# Patient Record
Sex: FEMALE | Race: OTHER | HISPANIC OR LATINO | Employment: FULL TIME | URBAN - METROPOLITAN AREA
[De-identification: names, ages, dates, MRNs, and addresses within clinical notes are randomized per-mention and may not be internally consistent; named-entity substitution may affect disease eponyms.]

---

## 2016-07-01 LAB
CFTR MUT ANL BLD/T: NORMAL
EXTERNAL ABO GROUPING: NORMAL
EXTERNAL ANTIBODY SCREEN: NORMAL
EXTERNAL CHLAMYDIA SCREEN: NORMAL
EXTERNAL GONORRHEA SCREEN: NORMAL
EXTERNAL HEMATOCRIT: 35 %
EXTERNAL HEMOGLOBIN: 11.3 G/DL
EXTERNAL HEPATITIS B SURFACE ANTIGEN: NORMAL
EXTERNAL HIV-1 ANTIBODY: NORMAL
EXTERNAL PLATELET COUNT: 173 K/ΜL
EXTERNAL RH FACTOR: POSITIVE
EXTERNAL RUBELLA IGG QUANTITATION: NORMAL
EXTERNAL SYPHILIS RPR SCREEN: NORMAL

## 2016-08-13 LAB — EXTERNAL AFP: NORMAL

## 2016-10-07 LAB — EXTERNAL SYPHILIS RPR SCREEN: NORMAL

## 2016-10-17 LAB — GLUCOSE 1H P 50 G GLC PO SERPL-MCNC: 119 MG/DL (ref 70–183)

## 2017-01-01 ENCOUNTER — HOSPITAL ENCOUNTER (INPATIENT)
Facility: HOSPITAL | Age: 32
LOS: 3 days | Discharge: HOME | End: 2017-01-04
Attending: OBSTETRICS & GYNECOLOGY | Admitting: OBSTETRICS & GYNECOLOGY
Payer: COMMERCIAL

## 2017-01-01 LAB
ABO GROUP BLD: NORMAL
BLD GP AB SCN SERPL QL: NEGATIVE
ERYTHROCYTE [DISTWIDTH] IN BLOOD BY AUTOMATED COUNT: 13.2 % (ref 11.6–15.1)
HCT VFR BLD AUTO: 36.1 % (ref 34.8–46.1)
HGB BLD-MCNC: 12.2 G/DL (ref 11.5–15.4)
MCH RBC QN AUTO: 30.4 PG (ref 26.8–34.3)
MCHC RBC AUTO-ENTMCNC: 33.8 G/DL (ref 31.4–37.4)
MCV RBC AUTO: 90 FL (ref 82–98)
PLATELET # BLD AUTO: 130 THOUSANDS/UL (ref 149–390)
PMV BLD AUTO: 11.9 FL (ref 8.9–12.7)
RBC # BLD AUTO: 4.01 MILLION/UL (ref 3.81–5.12)
RH BLD: POSITIVE
WBC # BLD AUTO: 11.9 THOUSAND/UL (ref 4.31–10.16)

## 2017-01-01 PROCEDURE — 86592 SYPHILIS TEST NON-TREP QUAL: CPT | Performed by: OBSTETRICS & GYNECOLOGY

## 2017-01-01 PROCEDURE — 4A1HXCZ MONITORING OF PRODUCTS OF CONCEPTION, CARDIAC RATE, EXTERNAL APPROACH: ICD-10-PCS | Performed by: OBSTETRICS & GYNECOLOGY

## 2017-01-01 PROCEDURE — 99202 OFFICE O/P NEW SF 15 MIN: CPT

## 2017-01-01 PROCEDURE — 85027 COMPLETE CBC AUTOMATED: CPT | Performed by: OBSTETRICS & GYNECOLOGY

## 2017-01-01 PROCEDURE — 86850 RBC ANTIBODY SCREEN: CPT | Performed by: OBSTETRICS & GYNECOLOGY

## 2017-01-01 PROCEDURE — 86900 BLOOD TYPING SEROLOGIC ABO: CPT | Performed by: OBSTETRICS & GYNECOLOGY

## 2017-01-01 PROCEDURE — 3E033VJ INTRODUCTION OF OTHER HORMONE INTO PERIPHERAL VEIN, PERCUTANEOUS APPROACH: ICD-10-PCS | Performed by: OBSTETRICS & GYNECOLOGY

## 2017-01-01 PROCEDURE — 86901 BLOOD TYPING SEROLOGIC RH(D): CPT | Performed by: OBSTETRICS & GYNECOLOGY

## 2017-01-01 RX ORDER — ONDANSETRON 2 MG/ML
4 INJECTION INTRAMUSCULAR; INTRAVENOUS EVERY 6 HOURS PRN
Status: DISCONTINUED | OUTPATIENT
Start: 2017-01-01 | End: 2017-01-02

## 2017-01-01 RX ORDER — LEVOTHYROXINE SODIUM 0.05 MG/1
50 TABLET ORAL DAILY
COMMUNITY

## 2017-01-01 RX ORDER — FERROUS SULFATE 325(65) MG
325 TABLET ORAL
COMMUNITY
End: 2018-05-06

## 2017-01-01 RX ORDER — BUTORPHANOL TARTRATE 1 MG/ML
1 INJECTION, SOLUTION INTRAMUSCULAR; INTRAVENOUS
Status: DISCONTINUED | OUTPATIENT
Start: 2017-01-01 | End: 2017-01-02

## 2017-01-01 RX ADMIN — SODIUM CHLORIDE 5 MILLION UNITS: 0.9 INJECTION, SOLUTION INTRAVENOUS at 22:37

## 2017-01-02 ENCOUNTER — ANESTHESIA (INPATIENT)
Dept: LABOR AND DELIVERY | Facility: HOSPITAL | Age: 32
End: 2017-01-02
Payer: COMMERCIAL

## 2017-01-02 ENCOUNTER — ANESTHESIA EVENT (INPATIENT)
Dept: LABOR AND DELIVERY | Facility: HOSPITAL | Age: 32
End: 2017-01-02
Payer: COMMERCIAL

## 2017-01-02 PROBLEM — Z3A.40 40 WEEKS GESTATION OF PREGNANCY: Status: ACTIVE | Noted: 2017-01-02

## 2017-01-02 LAB
BASE EXCESS BLDCOA CALC-SCNC: -6.8 MMOL/L (ref 3–11)
BASE EXCESS BLDCOV CALC-SCNC: -6.5 MMOL/L (ref 1–9)
HCO3 BLDCOA-SCNC: 19.8 MMOL/L (ref 17.3–27.3)
HCO3 BLDCOV-SCNC: 18 MMOL/L (ref 12.2–28.6)
O2 CT VFR BLDCOA CALC: 8.8 ML/DL
OXYHGB MFR BLDCOA: 38.7 %
OXYHGB MFR BLDCOV: 76.6 %
PCO2 BLDCOA: 43.5 MM[HG] (ref 30–60)
PCO2 BLDCOV: 33.7 MM HG (ref 27–43)
PH BLDCOA: 7.28 [PH] (ref 7.23–7.43)
PH BLDCOV: 7.35 [PH] (ref 7.19–7.49)
PO2 BLDCOA: 19.5 MM HG (ref 5–25)
PO2 BLDCOV: 33.5 MM HG (ref 15–45)
SAO2 % BLDCOV: 17.9 ML/DL

## 2017-01-02 PROCEDURE — 0UQMXZZ REPAIR VULVA, EXTERNAL APPROACH: ICD-10-PCS | Performed by: OBSTETRICS & GYNECOLOGY

## 2017-01-02 PROCEDURE — 4A1H7CZ MONITORING OF PRODUCTS OF CONCEPTION, CARDIAC RATE, VIA NATURAL OR ARTIFICIAL OPENING: ICD-10-PCS | Performed by: OBSTETRICS & GYNECOLOGY

## 2017-01-02 PROCEDURE — 0KQM0ZZ REPAIR PERINEUM MUSCLE, OPEN APPROACH: ICD-10-PCS | Performed by: OBSTETRICS & GYNECOLOGY

## 2017-01-02 PROCEDURE — 10H073Z INSERTION OF MONITORING ELECTRODE INTO PRODUCTS OF CONCEPTION, VIA NATURAL OR ARTIFICIAL OPENING: ICD-10-PCS | Performed by: OBSTETRICS & GYNECOLOGY

## 2017-01-02 PROCEDURE — 82805 BLOOD GASES W/O2 SATURATION: CPT | Performed by: OBSTETRICS & GYNECOLOGY

## 2017-01-02 RX ORDER — OXYCODONE HYDROCHLORIDE AND ACETAMINOPHEN 5; 325 MG/1; MG/1
1 TABLET ORAL EVERY 4 HOURS PRN
Status: DISCONTINUED | OUTPATIENT
Start: 2017-01-02 | End: 2017-01-04 | Stop reason: HOSPADM

## 2017-01-02 RX ORDER — BUPIVACAINE HYDROCHLORIDE 2.5 MG/ML
10 INJECTION, SOLUTION EPIDURAL; INFILTRATION; INTRACAUDAL ONCE
Status: COMPLETED | OUTPATIENT
Start: 2017-01-02 | End: 2017-01-02

## 2017-01-02 RX ORDER — LIDOCAINE HYDROCHLORIDE 10 MG/ML
10 INJECTION, SOLUTION EPIDURAL; INFILTRATION; INTRACAUDAL; PERINEURAL ONCE
Status: DISCONTINUED | OUTPATIENT
Start: 2017-01-02 | End: 2017-01-02

## 2017-01-02 RX ORDER — OXYTOCIN/RINGER'S LACTATE 30/500 ML
250 PLASTIC BAG, INJECTION (ML) INTRAVENOUS CONTINUOUS
Status: DISCONTINUED | OUTPATIENT
Start: 2017-01-02 | End: 2017-01-04 | Stop reason: HOSPADM

## 2017-01-02 RX ORDER — IBUPROFEN 600 MG/1
600 TABLET ORAL EVERY 6 HOURS PRN
Status: DISCONTINUED | OUTPATIENT
Start: 2017-01-02 | End: 2017-01-04 | Stop reason: HOSPADM

## 2017-01-02 RX ORDER — OXYCODONE HYDROCHLORIDE AND ACETAMINOPHEN 5; 325 MG/1; MG/1
2 TABLET ORAL EVERY 4 HOURS PRN
Status: DISCONTINUED | OUTPATIENT
Start: 2017-01-02 | End: 2017-01-04 | Stop reason: HOSPADM

## 2017-01-02 RX ORDER — OXYTOCIN/RINGER'S LACTATE 30/500 ML
2 PLASTIC BAG, INJECTION (ML) INTRAVENOUS
Status: DISCONTINUED | OUTPATIENT
Start: 2017-01-02 | End: 2017-01-02

## 2017-01-02 RX ORDER — LEVOTHYROXINE SODIUM 0.05 MG/1
50 TABLET ORAL
Status: DISCONTINUED | OUTPATIENT
Start: 2017-01-02 | End: 2017-01-04 | Stop reason: HOSPADM

## 2017-01-02 RX ORDER — SODIUM CHLORIDE, SODIUM LACTATE, POTASSIUM CHLORIDE, CALCIUM CHLORIDE 600; 310; 30; 20 MG/100ML; MG/100ML; MG/100ML; MG/100ML
125 INJECTION, SOLUTION INTRAVENOUS CONTINUOUS
Status: DISCONTINUED | OUTPATIENT
Start: 2017-01-02 | End: 2017-01-02

## 2017-01-02 RX ORDER — ACETAMINOPHEN 325 MG/1
650 TABLET ORAL EVERY 6 HOURS PRN
Status: DISCONTINUED | OUTPATIENT
Start: 2017-01-02 | End: 2017-01-04 | Stop reason: HOSPADM

## 2017-01-02 RX ORDER — ROPIVACAINE HYDROCHLORIDE 2 MG/ML
INJECTION, SOLUTION EPIDURAL; INFILTRATION; PERINEURAL AS NEEDED
Status: DISCONTINUED | OUTPATIENT
Start: 2017-01-02 | End: 2017-01-02 | Stop reason: SURG

## 2017-01-02 RX ORDER — DOCUSATE SODIUM 100 MG/1
100 CAPSULE, LIQUID FILLED ORAL 2 TIMES DAILY
Status: DISCONTINUED | OUTPATIENT
Start: 2017-01-02 | End: 2017-01-04 | Stop reason: HOSPADM

## 2017-01-02 RX ORDER — BUPIVACAINE HYDROCHLORIDE 2.5 MG/ML
INJECTION, SOLUTION EPIDURAL; INFILTRATION; INTRACAUDAL
Status: COMPLETED
Start: 2017-01-02 | End: 2017-01-02

## 2017-01-02 RX ORDER — DIAPER,BRIEF,INFANT-TODD,DISP
1 EACH MISCELLANEOUS AS NEEDED
Status: DISCONTINUED | OUTPATIENT
Start: 2017-01-02 | End: 2017-01-04 | Stop reason: HOSPADM

## 2017-01-02 RX ORDER — OXYTOCIN/RINGER'S LACTATE 30/500 ML
PLASTIC BAG, INJECTION (ML) INTRAVENOUS
Status: COMPLETED
Start: 2017-01-02 | End: 2017-01-02

## 2017-01-02 RX ORDER — SIMETHICONE 80 MG
80 TABLET,CHEWABLE ORAL 4 TIMES DAILY PRN
Status: DISCONTINUED | OUTPATIENT
Start: 2017-01-02 | End: 2017-01-04 | Stop reason: HOSPADM

## 2017-01-02 RX ADMIN — SODIUM CHLORIDE 2.5 MILLION UNITS: 9 INJECTION, SOLUTION INTRAVENOUS at 11:00

## 2017-01-02 RX ADMIN — ROPIVACAINE HYDROCHLORIDE 4 ML: 2 INJECTION, SOLUTION EPIDURAL; INFILTRATION at 10:17

## 2017-01-02 RX ADMIN — Medication 30 UNITS: at 00:40

## 2017-01-02 RX ADMIN — Medication 5 ML: at 04:59

## 2017-01-02 RX ADMIN — Medication: at 05:04

## 2017-01-02 RX ADMIN — DOCUSATE SODIUM 100 MG: 100 CAPSULE, LIQUID FILLED ORAL at 13:11

## 2017-01-02 RX ADMIN — ROPIVACAINE HYDROCHLORIDE 4 ML: 2 INJECTION, SOLUTION EPIDURAL; INFILTRATION at 08:07

## 2017-01-02 RX ADMIN — SODIUM CHLORIDE, SODIUM LACTATE, POTASSIUM CHLORIDE, AND CALCIUM CHLORIDE 125 ML/HR: .6; .31; .03; .02 INJECTION, SOLUTION INTRAVENOUS at 00:41

## 2017-01-02 RX ADMIN — ROPIVACAINE HYDROCHLORIDE 4 ML: 2 INJECTION, SOLUTION EPIDURAL; INFILTRATION at 08:10

## 2017-01-02 RX ADMIN — BUPIVACAINE HYDROCHLORIDE 10 ML: 2.5 INJECTION, SOLUTION EPIDURAL; INFILTRATION; INTRACAUDAL at 12:00

## 2017-01-02 RX ADMIN — Medication 5 ML: at 04:54

## 2017-01-02 RX ADMIN — LEVOTHYROXINE SODIUM 50 MCG: 50 TABLET ORAL at 13:11

## 2017-01-02 RX ADMIN — ROPIVACAINE HYDROCHLORIDE 4 ML: 2 INJECTION, SOLUTION EPIDURAL; INFILTRATION at 10:19

## 2017-01-02 RX ADMIN — Medication 5 ML: at 04:53

## 2017-01-02 RX ADMIN — SODIUM CHLORIDE 2.5 MILLION UNITS: 9 INJECTION, SOLUTION INTRAVENOUS at 06:02

## 2017-01-02 RX ADMIN — SODIUM CHLORIDE 2.5 MILLION UNITS: 9 INJECTION, SOLUTION INTRAVENOUS at 02:53

## 2017-01-03 LAB — RPR SER QL: NORMAL

## 2017-01-03 RX ADMIN — DOCUSATE SODIUM 100 MG: 100 CAPSULE, LIQUID FILLED ORAL at 22:40

## 2017-01-03 RX ADMIN — LEVOTHYROXINE SODIUM 50 MCG: 50 TABLET ORAL at 06:00

## 2017-01-04 VITALS
DIASTOLIC BLOOD PRESSURE: 88 MMHG | HEART RATE: 71 BPM | TEMPERATURE: 97.6 F | RESPIRATION RATE: 18 BRPM | SYSTOLIC BLOOD PRESSURE: 135 MMHG | WEIGHT: 260 LBS | HEIGHT: 63 IN | BODY MASS INDEX: 46.07 KG/M2

## 2017-01-04 RX ORDER — DIAPER,BRIEF,INFANT-TODD,DISP
1 EACH MISCELLANEOUS AS NEEDED
Qty: 30 G | Refills: 0 | Status: SHIPPED | OUTPATIENT
Start: 2017-01-04 | End: 2018-05-06

## 2017-01-04 RX ADMIN — LEVOTHYROXINE SODIUM 50 MCG: 50 TABLET ORAL at 06:12

## 2017-01-09 ENCOUNTER — TELEPHONE (OUTPATIENT)
Dept: LABOR AND DELIVERY | Facility: HOSPITAL | Age: 32
End: 2017-01-09

## 2017-01-11 LAB — PLACENTA IN STORAGE: NORMAL

## 2018-05-06 ENCOUNTER — HOSPITAL ENCOUNTER (EMERGENCY)
Facility: HOSPITAL | Age: 33
Discharge: HOME | End: 2018-05-06
Attending: EMERGENCY MEDICINE | Admitting: EMERGENCY MEDICINE
Payer: COMMERCIAL

## 2018-05-06 VITALS
WEIGHT: 260 LBS | RESPIRATION RATE: 18 BRPM | BODY MASS INDEX: 46.8 KG/M2 | SYSTOLIC BLOOD PRESSURE: 123 MMHG | DIASTOLIC BLOOD PRESSURE: 83 MMHG | TEMPERATURE: 97.6 F | OXYGEN SATURATION: 97 % | HEART RATE: 86 BPM

## 2018-05-06 DIAGNOSIS — L50.9 URTICARIA: Primary | ICD-10-CM

## 2018-05-06 PROCEDURE — 99283 EMERGENCY DEPT VISIT LOW MDM: CPT

## 2018-05-06 RX ORDER — PREDNISONE 20 MG/1
60 TABLET ORAL ONCE
Status: COMPLETED | OUTPATIENT
Start: 2018-05-06 | End: 2018-05-06

## 2018-05-06 RX ORDER — PREDNISONE 10 MG/1
TABLET ORAL
Qty: 30 TABLET | Refills: 0 | Status: SHIPPED | OUTPATIENT
Start: 2018-05-06 | End: 2019-08-02

## 2018-05-06 RX ADMIN — PREDNISONE 60 MG: 20 TABLET ORAL at 09:42

## 2018-05-06 NOTE — ED PROVIDER NOTES
History  Chief Complaint   Patient presents with    Rash     Noticed 2 weeks ago  On extremities and abd  Pt states this has happened to her in the past   Got better with prednisone at that time  Has tried Benadryl and Allegra with little relief  History provided by:  Patient   used: No    Rash   Location:  Torso and shoulder/arm  Shoulder/arm rash location:  L arm and R arm  Torso rash location:  Abd LUQ, abd LLQ, abd RLQ, abd RUQ and upper back  Quality: itchiness    Quality: not blistering, not bruising, not burning, not painful, not swelling and not weeping    Duration:  2 weeks  Progression:  Worsening  Chronicity:  Recurrent  Context: not medications and not sick contacts    Relieved by:  Nothing  Worsened by:  Nothing  Ineffective treatments:  Antihistamines  Associated symptoms: no abdominal pain, no diarrhea, no fever, no nausea, no shortness of breath, no sore throat, no throat swelling, no tongue swelling and not vomiting        Prior to Admission Medications   Prescriptions Last Dose Informant Patient Reported? Taking?   levothyroxine 50 mcg tablet   Yes No   Sig: Take 50 mcg by mouth daily      Facility-Administered Medications: None       Past Medical History:   Diagnosis Date    with pregnancy        Past Surgical History:   Procedure Laterality Date    POLYPECTOMY         History reviewed  No pertinent family history  I have reviewed and agree with the history as documented  Social History   Substance Use Topics    Smoking status: Never Smoker    Smokeless tobacco: Never Used    Alcohol use No        Review of Systems   Constitutional: Negative for chills and fever  HENT: Negative for congestion, ear discharge, ear pain, postnasal drip, rhinorrhea, sinus pressure, sneezing, sore throat and trouble swallowing  Eyes: Negative for discharge and redness  Respiratory: Negative for cough and shortness of breath  Cardiovascular: Negative for chest pain  Gastrointestinal: Negative for abdominal pain, diarrhea, nausea and vomiting  Skin: Positive for rash  All other systems reviewed and are negative  Physical Exam  ED Triage Vitals [05/06/18 0850]   Temperature Pulse Respirations Blood Pressure SpO2   97 6 °F (36 4 °C) 86 18 123/83 97 %      Temp src Heart Rate Source Patient Position - Orthostatic VS BP Location FiO2 (%)   -- -- -- -- --      Pain Score       No Pain           Orthostatic Vital Signs  Vitals:    05/06/18 0850   BP: 123/83   Pulse: 86       Physical Exam   Constitutional: She is oriented to person, place, and time  She appears well-developed and well-nourished  Non-toxic appearance  She does not have a sickly appearance  She does not appear ill  HENT:   Head: Normocephalic and atraumatic  Right Ear: Tympanic membrane normal  No drainage  Tympanic membrane is not injected, not erythematous and not bulging  No middle ear effusion  Left Ear: Tympanic membrane normal  No drainage  Tympanic membrane is not injected, not erythematous and not bulging  No middle ear effusion  Nose: Nose normal    Mouth/Throat: Oropharynx is clear and moist  No oropharyngeal exudate  Eyes: Conjunctivae are normal  Right eye exhibits no discharge  Left eye exhibits no discharge  Right conjunctiva is not injected  Left conjunctiva is not injected  Neck: Normal range of motion  Neck supple  No neck rigidity  Cardiovascular: Normal rate, regular rhythm, normal heart sounds and intact distal pulses  Exam reveals no gallop and no friction rub  No murmur heard  Pulmonary/Chest: Effort normal and breath sounds normal  No stridor  No respiratory distress  She has no wheezes  She has no rales  Abdominal: Soft  Bowel sounds are normal  She exhibits no distension  There is no tenderness  There is no rebound and no guarding  Lymphadenopathy:     She has no cervical adenopathy  Neurological: She is alert and oriented to person, place, and time     Skin: Skin is warm and dry  Rash noted  Rash is urticarial (To abdomen, back, arms)  No pallor  Nursing note and vitals reviewed  ED Medications  Medications   predniSONE tablet 60 mg (60 mg Oral Given 5/6/18 0942)       Diagnostic Studies  Results Reviewed     None                 No orders to display              Procedures  Procedures       Phone Contacts  ED Phone Contact    ED Course                               MDM  CritCare Time    Disposition  Final diagnoses:   Urticaria     Time reflects when diagnosis was documented in both MDM as applicable and the Disposition within this note     Time User Action Codes Description Comment    5/6/2018  9:34 AM Lotus Zambrano Add [L50 9] Urticaria       ED Disposition     ED Disposition Condition Comment    Discharge  Milena Colon discharge to home/self care  Condition at discharge: Good        Follow-up Information    None       Discharge Medication List as of 5/6/2018  9:38 AM      START taking these medications    Details   predniSONE 10 mg tablet Take 4 tabs x 3 days, 3 tabs x 3 days, 2 tabs x 3 days, 1 tab x 3 days, Print         CONTINUE these medications which have NOT CHANGED    Details   levothyroxine 50 mcg tablet Take 50 mcg by mouth daily, Until Discontinued, Historical Med           No discharge procedures on file      ED Provider  Electronically Signed by           Moisés Pizarro 24, DO  05/06/18 1010

## 2018-05-06 NOTE — ED NOTES
Patient has had few hives over the past 2 weeks  Treated with OTC Allegra  Woke today with more hives on her torso [place] around her waist and lower abdomen  Has not taken any Allegra today  No respiratory distress  c/o itching    Reports no change in hygiene products, soap, etc      Rolfe Hatchet, RN  05/06/18 0900

## 2018-05-06 NOTE — DISCHARGE INSTRUCTIONS
Urticaria   WHAT YOU NEED TO KNOW:   Urticaria is also called hives  Hives can change size and shape, and appear anywhere on your skin  They can be mild or severe and last from a few minutes to a few days  Hives may be a sign of a severe allergic reaction called anaphylaxis that needs immediate treatment  Urticaria that lasts longer than 6 weeks may be a chronic condition that needs long-term treatment  DISCHARGE INSTRUCTIONS:   Call 911 for signs or symptoms of anaphylaxis,  such as trouble breathing, swelling in your mouth or throat, or wheezing  You may also have itching, a rash, or feel like you are going to faint  Return to the emergency department if:   · Your heart is beating faster than it normally does  · You have cramping or severe pain in your abdomen  Contact your healthcare provider if:   · You have a fever  · Your skin still itches 24 hours after you take your medicine  · You still have hives after 7 days  · Your joints are painful and swollen  · You have questions or concerns about your condition or care  Medicines:   · Epinephrine  is used to treat severe allergic reactions such as anaphylaxis  · Antihistamines  decrease mild symptoms such as itching or a rash  · Steroids  decrease redness, pain, and swelling  · Take your medicine as directed  Contact your healthcare provider if you think your medicine is not helping or if you have side effects  Tell him of her if you are allergic to any medicine  Keep a list of the medicines, vitamins, and herbs you take  Include the amounts, and when and why you take them  Bring the list or the pill bottles to follow-up visits  Carry your medicine list with you in case of an emergency  Steps to take for signs or symptoms of anaphylaxis:   · Immediately  give 1 shot of epinephrine only into the outer thigh muscle  · Leave the shot in place  as directed   Your healthcare provider may recommend you leave it in place for up to 10 seconds before you remove it  This helps make sure all of the epinephrine is delivered  · Call 911 and go to the emergency department,  even if the shot improved symptoms  Do not drive yourself  Bring the used epinephrine shot with you  Safety precautions to take if you are at risk for anaphylaxis:   · Keep 2 shots of epinephrine with you at all times  You may need a second shot, because epinephrine only works for about 20 minutes and symptoms may return  Your healthcare provider can show you and family members how to give the shot  Check the expiration date every month and replace it before it expires  · Create an action plan  Your healthcare provider can help you create a written plan that explains the allergy and an emergency plan to treat a reaction  The plan explains when to give a second epinephrine shot if symptoms return or do not improve after the first  Give copies of the action plan and emergency instructions to family members, work and school staff, and  providers  Show them how to give a shot of epinephrine  · Be careful when you exercise  If you have had exercise-induced anaphylaxis, do not exercise right after you eat  Stop exercising right away if you start to develop any signs or symptoms of anaphylaxis  You may first feel tired, warm, or have itchy skin  Hives, swelling, and severe breathing problems may develop if you continue to exercise  · Carry medical alert identification  Wear medical alert jewelry or carry a card that explains the allergy  Ask your healthcare provider where to get these items  · Keep a record of triggers and symptoms  Record everything you eat, drink, or apply to your skin for 3 weeks  Include stressful events and what you were doing right before your hives started  Bring the record with you to follow-up visits with your healthcare provider  Manage urticaria:   · Cool your skin  This may help decrease itching  Apply a cool pack to your hives  Dip a hand towel in cool water, wring it out, and place it on your hives  You may also soak your skin in a cool oatmeal bath  · Do not rub your hives  This can irritate your skin and cause more hives  · Wear loose clothing  Tight clothes may irritate your skin and cause more hives  · Manage stress  Stress may trigger hives, or make them worse  Learn new ways to relax, such as deep breathing  Follow up with your healthcare provider as directed:  Write down your questions so you remember to ask them during your visits  © 2017 [address] Information is for End User's use only and may not be sold, redistributed or otherwise used for commercial purposes  All illustrations and images included in CareNotes® are the copyrighted property of [employer]  or Fredy Mcdonald  The above information is an  only  It is not intended as medical advice for individual conditions or treatments  Talk to your doctor, nurse or pharmacist before following any medical regimen to see if it is safe and effective for you

## 2018-08-06 ENCOUNTER — APPOINTMENT (EMERGENCY)
Dept: ULTRASOUND IMAGING | Facility: HOSPITAL | Age: 33
End: 2018-08-06
Payer: COMMERCIAL

## 2018-08-06 ENCOUNTER — HOSPITAL ENCOUNTER (EMERGENCY)
Facility: HOSPITAL | Age: 33
Discharge: HOME | End: 2018-08-07
Attending: EMERGENCY MEDICINE
Payer: COMMERCIAL

## 2018-08-06 VITALS
WEIGHT: 265 LBS | TEMPERATURE: 98.1 F | OXYGEN SATURATION: 98 % | RESPIRATION RATE: 16 BRPM | DIASTOLIC BLOOD PRESSURE: 81 MMHG | HEART RATE: 83 BPM | SYSTOLIC BLOOD PRESSURE: 118 MMHG | BODY MASS INDEX: 47.7 KG/M2

## 2018-08-06 DIAGNOSIS — N93.9 VAGINAL BLEEDING: ICD-10-CM

## 2018-08-06 DIAGNOSIS — Z34.90 PREGNANCY: ICD-10-CM

## 2018-08-06 DIAGNOSIS — O20.0 THREATENED ABORTION: Primary | ICD-10-CM

## 2018-08-06 DIAGNOSIS — N39.0 UTI (URINARY TRACT INFECTION): ICD-10-CM

## 2018-08-06 DIAGNOSIS — R10.9 ABDOMINAL CRAMPING: ICD-10-CM

## 2018-08-06 DIAGNOSIS — O41.8X10 SUBCHORIONIC HEMATOMA IN FIRST TRIMESTER: ICD-10-CM

## 2018-08-06 DIAGNOSIS — O46.8X1 SUBCHORIONIC HEMATOMA IN FIRST TRIMESTER: ICD-10-CM

## 2018-08-06 LAB
B-HCG SERPL-ACNC: ABNORMAL MIU/ML
BACTERIA UR QL AUTO: ABNORMAL /HPF
BASOPHILS # BLD AUTO: 0.02 THOUSANDS/ΜL (ref 0–0.1)
BASOPHILS NFR BLD AUTO: 0 % (ref 0–1)
BILIRUB UR QL STRIP: ABNORMAL
CLARITY UR: ABNORMAL
COLOR UR: ABNORMAL
EOSINOPHIL # BLD AUTO: 0.27 THOUSAND/ΜL (ref 0–0.61)
EOSINOPHIL NFR BLD AUTO: 3 % (ref 0–6)
ERYTHROCYTE [DISTWIDTH] IN BLOOD BY AUTOMATED COUNT: 14 % (ref 11.6–15.1)
EXT PREG TEST URINE: ABNORMAL
GLUCOSE UR STRIP-MCNC: NEGATIVE MG/DL
HCT VFR BLD AUTO: 33.5 % (ref 34.8–46.1)
HGB BLD-MCNC: 11.1 G/DL (ref 11.5–15.4)
HGB UR QL STRIP.AUTO: ABNORMAL
KETONES UR STRIP-MCNC: ABNORMAL MG/DL
LEUKOCYTE ESTERASE UR QL STRIP: ABNORMAL
LYMPHOCYTES # BLD AUTO: 2.78 THOUSANDS/ΜL (ref 0.6–4.47)
LYMPHOCYTES NFR BLD AUTO: 27 % (ref 14–44)
MCH RBC QN AUTO: 29.1 PG (ref 26.8–34.3)
MCHC RBC AUTO-ENTMCNC: 33.1 G/DL (ref 31.4–37.4)
MCV RBC AUTO: 88 FL (ref 82–98)
MONOCYTES # BLD AUTO: 0.66 THOUSAND/ΜL (ref 0.17–1.22)
MONOCYTES NFR BLD AUTO: 6 % (ref 4–12)
NEUTROPHILS # BLD AUTO: 6.56 THOUSANDS/ΜL (ref 1.85–7.62)
NEUTS SEG NFR BLD AUTO: 64 % (ref 43–75)
NITRITE UR QL STRIP: POSITIVE
NON-SQ EPI CELLS URNS QL MICRO: ABNORMAL /HPF
NRBC BLD AUTO-RTO: 0 /100 WBCS
PH UR STRIP.AUTO: 5 [PH] (ref 4.5–8)
PLATELET # BLD AUTO: 174 THOUSANDS/UL (ref 149–390)
PMV BLD AUTO: 10.4 FL (ref 8.9–12.7)
PROT UR STRIP-MCNC: >=300 MG/DL
RBC # BLD AUTO: 3.81 MILLION/UL (ref 3.81–5.12)
RBC #/AREA URNS AUTO: ABNORMAL /HPF
SP GR UR STRIP.AUTO: >=1.03 (ref 1–1.03)
UROBILINOGEN UR QL STRIP.AUTO: 2 E.U./DL
WBC # BLD AUTO: 10.29 THOUSAND/UL (ref 4.31–10.16)
WBC #/AREA URNS AUTO: ABNORMAL /HPF

## 2018-08-06 PROCEDURE — 86900 BLOOD TYPING SEROLOGIC ABO: CPT | Performed by: EMERGENCY MEDICINE

## 2018-08-06 PROCEDURE — 76801 OB US < 14 WKS SINGLE FETUS: CPT

## 2018-08-06 PROCEDURE — 86901 BLOOD TYPING SEROLOGIC RH(D): CPT | Performed by: EMERGENCY MEDICINE

## 2018-08-06 PROCEDURE — 81001 URINALYSIS AUTO W/SCOPE: CPT

## 2018-08-06 PROCEDURE — 81025 URINE PREGNANCY TEST: CPT | Performed by: EMERGENCY MEDICINE

## 2018-08-06 PROCEDURE — 36415 COLL VENOUS BLD VENIPUNCTURE: CPT | Performed by: EMERGENCY MEDICINE

## 2018-08-06 PROCEDURE — 86850 RBC ANTIBODY SCREEN: CPT | Performed by: EMERGENCY MEDICINE

## 2018-08-06 PROCEDURE — 87086 URINE CULTURE/COLONY COUNT: CPT

## 2018-08-06 PROCEDURE — 85025 COMPLETE CBC W/AUTO DIFF WBC: CPT | Performed by: EMERGENCY MEDICINE

## 2018-08-06 PROCEDURE — 84702 CHORIONIC GONADOTROPIN TEST: CPT | Performed by: EMERGENCY MEDICINE

## 2018-08-07 LAB
ABO GROUP BLD: NORMAL
BLD GP AB SCN SERPL QL: NEGATIVE
RH BLD: POSITIVE
SPECIMEN EXPIRATION DATE: NORMAL

## 2018-08-07 PROCEDURE — 99284 EMERGENCY DEPT VISIT MOD MDM: CPT

## 2018-08-07 RX ORDER — CEPHALEXIN 500 MG/1
500 CAPSULE ORAL 2 TIMES DAILY
Qty: 14 CAPSULE | Refills: 0 | Status: SHIPPED | OUTPATIENT
Start: 2018-08-07 | End: 2018-08-14

## 2018-08-07 NOTE — DISCHARGE INSTRUCTIONS
You have an intrauterine pregnancy that is measured at 11 weeks  You also have what is called a subchorionic hemorrhage that is causing the bleeding  You could still and up having a miscarriage however as of right now your pregnancy is living  Please return emergency department if you have any increase in bleeding greater than 1 pad an hour, any chest pain associated with this bleeding feelings of lightheadedness or wanting to pass out or any other concerns  Otherwise please follow-up with obstetrics  Their phone number has been provided  Abdominal Pain, Ambulatory Care   GENERAL INFORMATION:   Abdominal pain  can be dull, achy, or sharp  You may have pain in one area of your abdomen, or in your entire abdomen  Your pain may be caused by constipation, food sensitivity or poisoning, infection, or a blockage  Abdominal pain can also be caused by a hernia, appendicitis, or an ulcer  The cause of your abdominal pain may be unknown  Seek immediate care for the following symptoms:   · New chest pain or shortness of breath    · Pulsing pain in your upper abdomen or lower back that suddenly becomes constant    · Pain in the right lower abdominal area that worsens with movement    · Fever over 100 4°F (38°C) or shaking chills    · Vomiting and you cannot keep food or fluids down    · Pain does not improve or gets worse over the next 8 to 12 hours    · Blood in your vomit or bowel movements, or they look black and tarry    · Skin or the whites of your eyes turn yellow    · Large amount of vaginal bleeding that is not your monthly period  Treatment for abdominal pain  may include medicine to calm your stomach, prevent vomiting, or decrease pain  Follow up with your healthcare provider as directed:  Write down your questions so you remember to ask them during your visits  CARE AGREEMENT:   You have the right to help plan your care  Learn about your health condition and how it may be treated   Discuss treatment options with your caregivers to decide what care you want to receive  You always have the right to refuse treatment  The above information is an  only  It is not intended as medical advice for individual conditions or treatments  Talk to your doctor, nurse or pharmacist before following any medical regimen to see if it is safe and effective for you  2014 [address] is for End User's use only and may not be sold, redistributed or otherwise used for commercial purposes  All illustrations and images included in CareNotes® are the copyrighted property of A [employer] M [employer]  or Fredy Mcdonald  Urinary Tract Infection in Pregnancy   WHAT YOU NEED TO KNOW:   A urinary tract infection (UTI) is caused by bacteria that get inside your urinary tract  The urinary tract includes your kidneys and bladder  UTIs are common in women, especially during pregnancy  This is because of changes in your immune system, hormones, and uterus  As your uterus grows, your bladder may not completely empty  Bacteria can grow in the urine left in your bladder and cause a UTI  UTIs during pregnancy can increase your risk for a kidney infection and  labor  DISCHARGE INSTRUCTIONS:   Return to the emergency department if:   · You are urinating very little or not at all  · You have severe pain  · You have a fever and chills  Contact your healthcare provider if:   · You have pain in the sides of your back  · You do not feel better after 2 days of treatment  · You are vomiting  · You have questions or concerns about your condition or care  Medicines:   · Antibiotics  help fight a bacterial infection  · Medicines  may be given to decrease pain and burning when you urinate  They will also help decrease the feeling that you need to urinate often  These medicines will make your urine orange or red  · Take your medicine as directed    Contact your healthcare provider if you think your medicine is not helping or if you have side effects  Tell him or her if you are allergic to any medicine  Keep a list of the medicines, vitamins, and herbs you take  Include the amounts, and when and why you take them  Bring the list or the pill bottles to follow-up visits  Carry your medicine list with you in case of an emergency  Prevent another UTI:   · Urinate when you feel the urge  Do not hold your urine  Urinate as soon as needed  Always urinate after you have sex  This helps flush out bacteria passed during sex  · Drink liquids as directed  Ask how much liquid to drink each day and which liquids are best for you  You may need to drink more fluids than usual to help flush bacteria out of your urinary tract  Do not drink caffeine or carbonated liquids  These drinks can irritate your bladder  Your healthcare provider may recommend cranberry juice to help prevent a UTI  · Wipe from front to back after you urinate or have a bowel movement  This will help prevent germs from getting into your urinary tract through your urethra  · Do pelvic muscle exercises often  Pelvic muscle exercises may help you start and stop urinating  Strong pelvic muscles may help you empty your bladder easier  Squeeze these muscles tightly for 5 seconds like you are trying to hold back urine  Then relax for 5 seconds  Gradually work up to squeezing for 10 seconds  Do 3 sets of 15 repetitions a day, or as directed  Follow up with your healthcare provider as directed: You may need to return for more urine tests  Write down your questions so you remember to ask them during your visits  © 2017 [address] Information is for End User's use only and may not be sold, redistributed or otherwise used for commercial purposes  All illustrations and images included in CareNotes® are the copyrighted property of [employer]  or Fredy Mcdonald    The above information is an  only  It is not intended as medical advice for individual conditions or treatments  Talk to your doctor, nurse or pharmacist before following any medical regimen to see if it is safe and effective for you  Threatened Miscarriage   WHAT YOU NEED TO KNOW:   A threatened miscarriage occurs when you have vaginal bleeding within the first 20 weeks of pregnancy  It means that a miscarriage may happen  A threatened miscarriage may also be called a threatened   DISCHARGE INSTRUCTIONS:   Return to the emergency department if:   · You feel weak or faint  · Your pain or cramping in your abdomen or back gets worse  · You have vaginal bleeding that soaks 1 or more pads in an hour  · You pass material that looks like tissue or large clots  Contact your healthcare provider or obstetrician if:   · You have a fever  · You have trouble urinating, burning when you urinate, or feel a need to urinate often  · You have new or worsening vaginal bleeding  · You have vaginal pain or itching, or vaginal discharge that is yellow, green, or foul-smelling  · You have questions or concerns about your condition or care  Self-care: The following may help you manage your symptoms and decrease your risk for a miscarriage:  · Do not put anything in your vagina  Do not have sex, douche, or use tampons  These actions may increase your risk for infection and miscarriage  · Rest as directed  Do not exercise or do strenuous activities  These activities may cause  labor or miscarriage  Ask your healthcare provider what activities are okay to do  Stay healthy during pregnancy:   · Eat a variety of healthy foods  Healthy foods can help you get extra protein, water, and calories that you need while you are pregnant  Healthy foods include fruits, vegetables, whole-grain breads, low-fat dairy products, beans, lean meats, and fish  Avoid raw or undercooked meat and fish   Ask your healthcare provider if you need a special diet  · Take prenatal vitamins as directed  These help you get the right amount of vitamins and minerals  They may also decrease the risk of certain birth defects  · Do not drink alcohol or use illegal drugs  These can increase your risk for a miscarriage or harm your baby  · Do not smoke  Nicotine and other chemicals in cigarettes and cigars can harm your baby and cause miscarriage or  labor  Ask your healthcare provider for information if you currently smoke and need help to quit  E-cigarettes or smokeless tobacco still contain nicotine  Do not use these products  · Decrease your risk for an infection  Always wash your hands before eating or preparing meals  Do not spend time with people who are sick  Ask your healthcare provider if you need immunizations such as the flu or hepatitis B vaccine  Immunizations may decrease your risk for infections that could cause a miscarriage  · Manage your medical conditions  Keep your blood pressure and blood sugars under control  Maintain a healthy weight during pregnancy  Follow up with your obstetrician as directed: You may need to see your obstetrician frequently for ultrasounds or blood tests  Write down your questions so you remember to ask them during your visits  ©  2600 [facility] Information is for End User's use only and may not be sold, redistributed or otherwise used for commercial purposes  All illustrations and images included in CareNotes® are the copyrighted property of [employer]  or Fredy Mcdonald  The above information is an  only  It is not intended as medical advice for individual conditions or treatments  Talk to your doctor, nurse or pharmacist before following any medical regimen to see if it is safe and effective for you

## 2018-08-08 LAB — BACTERIA UR CULT: NORMAL

## 2018-08-11 NOTE — ED PROVIDER NOTES
History  Chief Complaint   Patient presents with    Vaginal Bleeding     patient reports suprapubic cramping intermittently today  reports vaginal bleeding started this evening  reports cramping intensified when bleeding started  states "it could be my period but it feels different"  HPI  59-year-old female who is morbidly obese, presents vaginal bleeding and cramping that started today  Denies being more than 1 pad an hour, denies any chest pain pressure presyncope or syncope  Patient thinks this could be her  Could also be something different  Patient thinks she could also be pregnant  Patient otherwise denies fevers chills sweats chest pain  Denies nausea vomiting  Prior to Admission Medications   Prescriptions Last Dose Informant Patient Reported? Taking?   levothyroxine 50 mcg tablet   Yes No   Sig: Take 50 mcg by mouth daily   predniSONE 10 mg tablet   No No   Sig: Take 4 tabs x 3 days, 3 tabs x 3 days, 2 tabs x 3 days, 1 tab x 3 days      Facility-Administered Medications: None       Past Medical History:   Diagnosis Date    with pregnancy        Past Surgical History:   Procedure Laterality Date    POLYPECTOMY         History reviewed  No pertinent family history  I have reviewed and agree with the history as documented  Social History   Substance Use Topics    Smoking status: Never Smoker    Smokeless tobacco: Never Used    Alcohol use No        Review of Systems   Constitutional: Negative  HENT: Negative  Eyes: Negative  Respiratory: Negative  Cardiovascular: Negative  Gastrointestinal: Positive for abdominal pain  Negative for abdominal distention  Endorses abdominal cramping   Endocrine: Negative  Genitourinary: Positive for vaginal bleeding  Musculoskeletal: Negative  Skin: Negative  Allergic/Immunologic: Negative  Neurological: Negative  Hematological: Negative  Psychiatric/Behavioral: Negative          Physical Exam  ED Triage Vitals Temperature Pulse Respirations Blood Pressure SpO2   08/06/18 2157 08/06/18 2157 08/06/18 2157 08/06/18 2157 08/06/18 2157   97 5 °F (36 4 °C) 87 17 154/76 99 %      Temp Source Heart Rate Source Patient Position - Orthostatic VS BP Location FiO2 (%)   08/06/18 2157 08/06/18 2228 08/06/18 2157 08/06/18 2157 --   Oral Monitor Sitting Right arm       Pain Score       --                  Orthostatic Vital Signs  Vitals:    08/06/18 2157 08/06/18 2228   BP: 154/76 118/81   Pulse: 87 83   Patient Position - Orthostatic VS: Sitting Lying       Physical Exam   Constitutional: She is oriented to person, place, and time  She appears well-developed and well-nourished  No distress  HENT:   Head: Normocephalic and atraumatic  Right Ear: External ear normal    Left Ear: External ear normal    Mouth/Throat: Oropharynx is clear and moist    Eyes: Conjunctivae and EOM are normal  Pupils are equal, round, and reactive to light  Right eye exhibits no discharge  Left eye exhibits no discharge  No scleral icterus  Neck: Normal range of motion  Neck supple  No tracheal deviation present  No thyromegaly present  Cardiovascular: Normal rate, regular rhythm and intact distal pulses  Exam reveals no gallop and no friction rub  No murmur heard  Pulmonary/Chest: Effort normal and breath sounds normal  No stridor  No respiratory distress  She has no wheezes  She has no rales  Abdominal: Soft  Bowel sounds are normal  She exhibits no distension  There is no tenderness  There is no rebound and no guarding  Musculoskeletal: Normal range of motion  She exhibits no edema or deformity  Neurological: She is alert and oriented to person, place, and time  No cranial nerve deficit  Skin: Skin is warm and dry  No rash noted  She is not diaphoretic  No erythema  Psychiatric: She has a normal mood and affect  Her behavior is normal  Thought content normal    Nursing note and vitals reviewed        ED Medications  Medications - No data to display    Diagnostic Studies  Results Reviewed     Procedure Component Value Units Date/Time    Urine culture [[id number]] Collected:  08/06/18 2250    Lab Status:  Final result Specimen:  Urine from Urine, Clean Catch Updated:  08/08/18 1213     Urine Culture 10,000-19,000 cfu/ml     hCG, quantitative [[id number]]  (Abnormal) Collected:  08/06/18 2253    Lab Status:  Final result Specimen:  Blood from Arm, Right Updated:  08/06/18 2343     HCG, Quant 47,090 6 (H) mIU/mL     Narrative:          Expected Ranges:     Approximate               Approximate HCG  Gestation age          Concentration ( mIU/mL)  _____________          ______________________   Jenet Ruiz                      HCG values  0 2-1                       5-50  1-2                           2-3                         100-5000  3-4                         500-30752  4-5                         1000-59344  5-6                         [id number]  6-8                         52081-072947  8-12                        55567-659518    Urine Microscopic [[id number]]  (Abnormal) Collected:  08/06/18 2250    Lab Status:  Final result Specimen:  Urine from Urine, Clean Catch Updated:  08/06/18 2317     RBC, UA Innumerable (A) /hpf      WBC, UA       Field obscured, unable to enumerate (A)     /hpf     Epithelial Cells       Field obscured, unable to enumerate (A)     /hpf     Bacteria, UA       Field obscured, unable to enumerate (A)     /hpf    CBC and differential [[id number]]  (Abnormal) Collected:  08/06/18 2253    Lab Status:  Final result Specimen:  Blood from Arm, Right Updated:  08/06/18 2306     WBC 10 29 (H) Thousand/uL      RBC 3 81 Million/uL      Hemoglobin 11 1 (L) g/dL      Hematocrit 33 5 (L) %      MCV 88 fL      MCH 29 1 pg      MCHC 33 1 g/dL      RDW 14 0 %      MPV 10 4 fL      Platelets 880 Thousands/uL      nRBC 0 /100 WBCs      Neutrophils Relative 64 %      Lymphocytes Relative 27 %      Monocytes Relative 6 %      Eosinophils Relative 3 %      Basophils Relative 0 %      Neutrophils Absolute 6 56 Thousands/µL      Lymphocytes Absolute 2 78 Thousands/µL      Monocytes Absolute 0 66 Thousand/µL      Eosinophils Absolute 0 27 Thousand/µL      Basophils Absolute 0 02 Thousands/µL     POCT pregnancy, urine [[id number]]  (Abnormal) Resulted:  08/06/18 2245    Lab Status:  Final result Updated:  08/06/18 2245     EXT PREG TEST UR (Ref: Negative) Positive (+)    POCT urinalysis dipstick [[id number]]  (Abnormal) Resulted:  08/06/18 2243    Lab Status:  Final result Specimen:  Urine Updated:  08/06/18 2245    ED Urine Macroscopic [[id number]]  (Abnormal) Collected:  08/06/18 2250    Lab Status:  Final result Specimen:  Urine Updated:  08/06/18 2243     Color, UA Bloody     Clarity, UA Slightly Cloudy     pH, UA 5 0     Leukocytes, UA Large (A)     Nitrite, UA Positive (A)     Protein, UA >=300 (A) mg/dl      Glucose, UA Negative mg/dl      Ketones, UA 15 (1+) (A) mg/dl      Urobilinogen, UA 2 0 (A) E U /dl      Bilirubin, UA Interference- unable to analyze (A)     Blood, UA Large (A)     Specific Gravity, UA >=1 030    Narrative:       CLINITEK RESULT                 US OB < 14 weeks with transvaginal   Final Result by Zachary Marin MD (08/06 2321)      Single live intrauterine gestation at 11 weeks 0 days (range +/- 1 week 0 days)  MAURICIO of 2/25/2019  Tiny subchorionic hemorrhage  Workstation performed: [id number]               Procedures  Procedures      Phone Consults  ED Phone Contact    ED Course  ED Course as of Aug 11 1313   Tue Aug 07, 2018   0011 Patient has a documented A positive blood type in her chart from January 2017  patient was found to be pregnant by her urine pregnancy test   HCG quant was ordered  Ultrasound her was also ordered patient is found to have urinary tract infection    Discussed results of the ultrasound with patient joint that she had a single live intrauterine pregnancy with a subchorionic hemorrhage  Discussed that this could be a threatened , she could still abort she would need to follow up with OBGYN  Discussed return precautions including bleeding greater than 1 pad an hour, any bleeding with presyncope, syncope, shortness of breath  Also discussed increased abdominal   Patient verbalized understanding was discharged                          MDM  Number of Diagnoses or Management Options  Abdominal cramping:   Pregnancy:   Subchorionic hematoma in first trimester:   Threatened :   UTI (urinary tract infection):   Vaginal bleeding:   Diagnosis management comments: 26-year-old woman presents for evaluation of abdominal cramping, with vaginal bleeding  Will evaluate with UA, urine pregnancy  Will get a CBC because of her bleeding  Disposition will be pending results of the workup  CritCare Time    Disposition  Final diagnoses:   Threatened    Pregnancy   UTI (urinary tract infection)   Abdominal cramping   Vaginal bleeding   Subchorionic hematoma in first trimester     Time reflects when diagnosis was documented in both MDM as applicable and the Disposition within this note     Time User Action Codes Description Comment    2018 12:01 AM Brandyn Chars Add [O20 0] Threatened      2018 12:01 AM Sagar Hurley [F19 32] Pregnancy     2018 12:02 AM Sparks [place]coe Chars Add [N39 0] UTI (urinary tract infection)     2018 12:02 AM Brandyn Chars Add [R10 9] Abdominal cramping     2018 12:02 AM Sparks [place] Chars Add [N93 9] Vaginal bleeding     2018 12:06 AM Brandyn Chars Add [Z35 1C44,  O46 8X1] Subchorionic hematoma in first trimester       ED Disposition     ED Disposition Condition Comment    Discharge  Tifton Colon discharge to home/self care      Condition at discharge: Stable        Follow-up Information     Follow up With Specialties Details Why [address] Obstetrics and Gynecology Call in 1 day To follow up being seen in the Emergency department [place] [id number]  [phone]     Taylor Granados MD Family Medicine Schedule an appointment as soon as possible for a visit As needed, If symptoms worsen [address]  [address] Emergency Department Emergency Medicine Go to As needed, If symptoms worsen [address] [facility] ED, [address] [facility] , [place], [place], [address]          Discharge Medication List as of 8/7/2018 12:11 AM      START taking these medications    Details   cephalexin (KEFLEX) 500 mg capsule Take 1 capsule (500 mg total) by mouth 2 (two) times a day for 7 days, Starting Tue 8/7/2018, Until Tue 8/14/2018, Print         CONTINUE these medications which have NOT CHANGED    Details   levothyroxine 50 mcg tablet Take 50 mcg by mouth daily, Until Discontinued, Historical Med      predniSONE 10 mg tablet Take 4 tabs x 3 days, 3 tabs x 3 days, 2 tabs x 3 days, 1 tab x 3 days, Print           No discharge procedures on file  ED Provider  Attending physically available and evaluated Debbie Burrows  KENYA managed the patient along with the ED Attending      Electronically Signed by         Erica Arambula MD  08/11/18 2992

## 2018-08-15 ENCOUNTER — HOSPITAL ENCOUNTER (EMERGENCY)
Facility: HOSPITAL | Age: 33
Discharge: HOME | End: 2018-08-15
Attending: EMERGENCY MEDICINE
Payer: COMMERCIAL

## 2018-08-15 VITALS
OXYGEN SATURATION: 98 % | HEART RATE: 103 BPM | DIASTOLIC BLOOD PRESSURE: 103 MMHG | SYSTOLIC BLOOD PRESSURE: 138 MMHG | TEMPERATURE: 98.1 F | RESPIRATION RATE: 16 BRPM

## 2018-08-15 DIAGNOSIS — O20.0 THREATENED MISCARRIAGE IN EARLY PREGNANCY: Primary | ICD-10-CM

## 2018-08-15 PROCEDURE — 99283 EMERGENCY DEPT VISIT LOW MDM: CPT

## 2018-08-15 NOTE — DISCHARGE INSTRUCTIONS
Threatened Miscarriage   WHAT YOU NEED TO KNOW:   A threatened miscarriage occurs when you have vaginal bleeding within the first 20 weeks of pregnancy  It means that a miscarriage may happen  A threatened miscarriage may also be called a threatened   DISCHARGE INSTRUCTIONS:   Return to the emergency department if:   · You feel weak or faint  · Your pain or cramping in your abdomen or back gets worse  · You have vaginal bleeding that soaks 1 or more pads in an hour  · You pass material that looks like tissue or large clots  Contact your healthcare provider or obstetrician if:   · You have a fever  · You have trouble urinating, burning when you urinate, or feel a need to urinate often  · You have new or worsening vaginal bleeding  · You have vaginal pain or itching, or vaginal discharge that is yellow, green, or foul-smelling  · You have questions or concerns about your condition or care  Self-care: The following may help you manage your symptoms and decrease your risk for a miscarriage:  · Do not put anything in your vagina  Do not have sex, douche, or use tampons  These actions may increase your risk for infection and miscarriage  · Rest as directed  Do not exercise or do strenuous activities  These activities may cause  labor or miscarriage  Ask your healthcare provider what activities are okay to do  Stay healthy during pregnancy:   · Eat a variety of healthy foods  Healthy foods can help you get extra protein, water, and calories that you need while you are pregnant  Healthy foods include fruits, vegetables, whole-grain breads, low-fat dairy products, beans, lean meats, and fish  Avoid raw or undercooked meat and fish  Ask your healthcare provider if you need a special diet  · Take prenatal vitamins as directed  These help you get the right amount of vitamins and minerals  They may also decrease the risk of certain birth defects      · Do not drink alcohol or use illegal drugs  These can increase your risk for a miscarriage or harm your baby  · Do not smoke  Nicotine and other chemicals in cigarettes and cigars can harm your baby and cause miscarriage or  labor  Ask your healthcare provider for information if you currently smoke and need help to quit  E-cigarettes or smokeless tobacco still contain nicotine  Do not use these products  · Decrease your risk for an infection  Always wash your hands before eating or preparing meals  Do not spend time with people who are sick  Ask your healthcare provider if you need immunizations such as the flu or hepatitis B vaccine  Immunizations may decrease your risk for infections that could cause a miscarriage  · Manage your medical conditions  Keep your blood pressure and blood sugars under control  Maintain a healthy weight during pregnancy  Follow up with your obstetrician as directed: You may need to see your obstetrician frequently for ultrasounds or blood tests  Write down your questions so you remember to ask them during your visits  ©  [address] [employer] Information is for End User's use only and may not be sold, redistributed or otherwise used for commercial purposes  All illustrations and images included in CareNotes® are the copyrighted property of [employer]  or Fredy Mcdonald  The above information is an  only  It is not intended as medical advice for individual conditions or treatments  Talk to your doctor, nurse or pharmacist before following any medical regimen to see if it is safe and effective for you

## 2018-08-15 NOTE — ED PROVIDER NOTES
History  Chief Complaint   Patient presents with    Vaginal Bleeding     Patient reports she was seen in the department on 8/6 and found out she was 11 weeks pregnant at the time  Patient was experiencing vaginal bleeding and states she was instructed to return if she was still having vaginal bleeding/cramping  Patient reporting the bleeding is similar to a menstrual period  Patient states she also passed some clots yesterday  Patient has not yet followed up with an OB  60-year-old female with no past medical history presents to the emergency department with ongoing vaginal bleeding  Patient was seen here on August 6th for similar complaint and had an ultrasound and blood work done  The ultrasound showed that she was 11 weeks pregnant with a tiny subchorionic hemorrhage on formal ultrasound  She had a quant ranging about 49,000  She states that she started having clots, lightheadedness and worse cramping yesterday which concerned her  She states she went through 4 pads yesterday but today only went through 1  This is patient's 3rd pregnancy  She has 1 living child  Her 1st pregnancy ended in miscarriage at 6 weeks gestation  She states she has not followed up with OB since her last OB no longer takes her insurance          History provided by:  Patient   used: No    Vaginal Bleeding   Quality:  Typical of menses and clots  Severity:  Unable to specify  Onset quality:  Gradual  Duration:  1 week  Timing:  Constant  Progression:  Worsening  Chronicity:  New  Number of pads used:  1-4 daily  Possible pregnancy: yes    Context: spontaneously    Relieved by:  None tried  Worsened by:  Nothing  Ineffective treatments:  None tried  Associated symptoms: abdominal pain    Associated symptoms: no back pain, no dizziness, no dyspareunia, no dysuria, no fatigue, no fever, no nausea and no vaginal discharge    Abdominal pain:     Location:  LLQ, RLQ and suprapubic    Quality: cramping Onset quality:  Gradual    Duration:  7 days    Timing:  Intermittent    Chronicity:  New  Risk factors: prior miscarriage    Risk factors: no hx of ectopic pregnancy        Prior to Admission Medications   Prescriptions Last Dose Informant Patient Reported? Taking? cephalexin (KEFLEX) 500 mg capsule   No No   Sig: Take 1 capsule (500 mg total) by mouth 2 (two) times a day for 7 days   levothyroxine 50 mcg tablet   Yes No   Sig: Take 50 mcg by mouth daily   predniSONE 10 mg tablet   No No   Sig: Take 4 tabs x 3 days, 3 tabs x 3 days, 2 tabs x 3 days, 1 tab x 3 days      Facility-Administered Medications: None       Past Medical History:   Diagnosis Date    Hypothyroidism     with pregnancy        Past Surgical History:   Procedure Laterality Date    POLYPECTOMY         History reviewed  No pertinent family history  I have reviewed and agree with the history as documented  Social History   Substance Use Topics    Smoking status: Never Smoker    Smokeless tobacco: Never Used    Alcohol use No        Review of Systems   Constitutional: Negative  Negative for chills, diaphoresis, fatigue and fever  HENT: Negative  Negative for congestion, rhinorrhea and sore throat  Eyes: Negative  Negative for discharge, redness and itching  Respiratory: Negative  Negative for apnea, cough, chest tightness, shortness of breath and wheezing  Cardiovascular: Negative for chest pain, palpitations and leg swelling  Gastrointestinal: Positive for abdominal pain  Negative for nausea  Endocrine: Negative  Genitourinary: Positive for vaginal bleeding  Negative for dyspareunia, dysuria, flank pain, frequency, urgency and vaginal discharge  Musculoskeletal: Negative  Negative for back pain  Skin: Negative  Allergic/Immunologic: Negative  Neurological: Positive for light-headedness  Negative for dizziness, tremors, seizures, syncope, facial asymmetry, speech difficulty, weakness, numbness and headaches  Hematological: Negative  All other systems reviewed and are negative  Physical Exam  Physical Exam   Constitutional: She is oriented to person, place, and time  She appears well-developed and well-nourished  Non-toxic appearance  She does not have a sickly appearance  She does not appear ill  No distress  HENT:   Head: Normocephalic and atraumatic  Right Ear: External ear normal    Left Ear: External ear normal    Eyes: Conjunctivae are normal  Pupils are equal, round, and reactive to light  Right eye exhibits no discharge  Left eye exhibits no discharge  No scleral icterus  Cardiovascular: Normal rate, regular rhythm and normal heart sounds  Exam reveals no gallop and no friction rub  No murmur heard  Pulmonary/Chest: Effort normal and breath sounds normal  No respiratory distress  She has no wheezes  She has no rales  She exhibits no tenderness  Abdominal: Soft  Bowel sounds are normal  She exhibits no distension and no mass  There is no tenderness  No hernia  Obese   Neurological: She is alert and oriented to person, place, and time  She has normal reflexes  She exhibits normal muscle tone  Skin: Skin is warm and dry  No rash noted  She is not diaphoretic  No erythema  No pallor  Psychiatric: She has a normal mood and affect  Nursing note and vitals reviewed        Vital Signs  ED Triage Vitals [08/15/18 0936]   Temperature Pulse Respirations Blood Pressure SpO2   98 1 °F (36 7 °C) 103 16 (!) 138/103 98 %      Temp Source Heart Rate Source Patient Position - Orthostatic VS BP Location FiO2 (%)   Oral Monitor Sitting Right arm --      Pain Score       6           Vitals:    08/15/18 0936   BP: (!) 138/103   Pulse: 103   Patient Position - Orthostatic VS: Sitting       Visual Acuity      ED Medications  Medications - No data to display    Diagnostic Studies  Results Reviewed     None                 No orders to display              Procedures  Procedures       Phone Contacts  ED Phone Contact    ED Course                               MDM  Number of Diagnoses or Management Options  Diagnosis management comments: 55-year-old female presents with ongoing vaginal bleeding  Currently she is 12 weeks pregnant  She states the bleeding has become worse with clots and worse cramping  On exam she appears well in no acute distress  She has no abdominal tenderness on exam   Bedside ultrasound shows live, active IUP measuring at 12 weeks with a heart rate of 166  Will provide patient with number for Critical access hospital and encourage follow-up with them this week or next week  Amount and/or Complexity of Data Reviewed  Independent visualization of images, tracings, or specimens: yes      CritCare Time    Disposition  Final diagnoses:   Threatened miscarriage in early pregnancy     Time reflects when diagnosis was documented in both MDM as applicable and the Disposition within this note     Time User Action Codes Description Comment    8/15/2018 10:10 AM Elena Phillip Add [O20 0] Threatened miscarriage in early pregnancy       ED Disposition     ED Disposition Condition Comment    Discharge  Angella Gomez discharge to home/self care  Condition at discharge: Good        Follow-up Information     Follow up With Specialties Details Why [facility] Obstetrics and Gynecology Schedule an appointment as soon as possible for a visit in 1 day  Andrew Pen[place]za [id number] [phone]          Patient's Medications   Discharge Prescriptions    No medications on file     No discharge procedures on file      ED Provider  Electronically Signed by           Genna Munson DO  08/15/18 1011

## 2018-08-15 NOTE — ED PROCEDURE NOTE
PROCEDURE  Pelvic Ultrasound  Performed by: Makenna Neal  Authorized by: Makenna Neal     Procedure date/time:  8/15/2018 10:06 AM  Patient location:  ED  Procedure details:     Indications: pregnant with vaginal bleeding      Assess:  Fetal viability    Technique:  Transabdominal obstetric (limited) exam  Uterine findings:     Intrauterine pregnancy: identified      Single gestation: identified      Fetal heart rate: identified      Fetal heart rate (bpm):  166    Crown-rump length (mm): 12w0d    Comments:      Good fetal activity                 Naomy Yuan DO  08/15/18 1007

## 2018-08-29 NOTE — ED ATTENDING ATTESTATION
Britney Calloway MD, saw and evaluated the patient  I have discussed the patient with the resident/non-physician practitioner and agree with the resident's/non-physician practitioner's findings, Plan of Care, and MDM as documented in the resident's/non-physician practitioner's note, except where noted  All available labs and Radiology studies were reviewed  At this point I agree with the current assessment done in the Emergency Department  I have conducted an independent evaluation of this patient a history and physical is as follows:    27 y/o female for eval of vag bleeding  History of irregular periods and patient concerned she could be pregnan  Mild, diffuse cramping pain similar to a mentrual period  Pain mild without radiation, no modifying factors  No n/v/d  No cp, sob  PE: gcs 15 nf, sclera non-icteric, conjunctiva normal, RRR, CTAB, abd soft, nt, nd, no r/g  Plan: preg test, UA, CBC, re-assess       Critical Care Time  CritCare Time    Procedures

## 2019-08-02 ENCOUNTER — APPOINTMENT (EMERGENCY)
Dept: ULTRASOUND IMAGING | Facility: HOSPITAL | Age: 34
End: 2019-08-02
Payer: COMMERCIAL

## 2019-08-02 ENCOUNTER — HOSPITAL ENCOUNTER (EMERGENCY)
Facility: HOSPITAL | Age: 34
Discharge: HOME | End: 2019-08-02
Attending: EMERGENCY MEDICINE | Admitting: EMERGENCY MEDICINE
Payer: COMMERCIAL

## 2019-08-02 VITALS
SYSTOLIC BLOOD PRESSURE: 132 MMHG | BODY MASS INDEX: 36.27 KG/M2 | HEART RATE: 60 BPM | TEMPERATURE: 97.9 F | WEIGHT: 201.5 LBS | RESPIRATION RATE: 16 BRPM | DIASTOLIC BLOOD PRESSURE: 74 MMHG | OXYGEN SATURATION: 98 %

## 2019-08-02 DIAGNOSIS — R10.9 ABDOMINAL PAIN: ICD-10-CM

## 2019-08-02 DIAGNOSIS — R11.2 NAUSEA AND VOMITING: ICD-10-CM

## 2019-08-02 DIAGNOSIS — K80.20 CHOLELITHIASIS: Primary | ICD-10-CM

## 2019-08-02 LAB
ALBUMIN SERPL BCP-MCNC: 3.1 G/DL (ref 3.5–5)
ALP SERPL-CCNC: 49 U/L (ref 46–116)
ALT SERPL W P-5'-P-CCNC: 17 U/L (ref 12–78)
ANION GAP SERPL CALCULATED.3IONS-SCNC: 12 MMOL/L (ref 4–13)
AST SERPL W P-5'-P-CCNC: 38 U/L (ref 5–45)
BASOPHILS # BLD AUTO: 0.04 THOUSANDS/ΜL (ref 0–0.1)
BASOPHILS NFR BLD AUTO: 1 % (ref 0–1)
BILIRUB SERPL-MCNC: 0.64 MG/DL (ref 0.2–1)
BUN SERPL-MCNC: 7 MG/DL (ref 5–25)
CALCIUM SERPL-MCNC: 8.8 MG/DL (ref 8.3–10.1)
CHLORIDE SERPL-SCNC: 108 MMOL/L (ref 100–108)
CO2 SERPL-SCNC: 22 MMOL/L (ref 21–32)
CREAT SERPL-MCNC: 0.54 MG/DL (ref 0.6–1.3)
EOSINOPHIL # BLD AUTO: 0.18 THOUSAND/ΜL (ref 0–0.61)
EOSINOPHIL NFR BLD AUTO: 2 % (ref 0–6)
ERYTHROCYTE [DISTWIDTH] IN BLOOD BY AUTOMATED COUNT: 14.3 % (ref 11.6–15.1)
GFR SERPL CREATININE-BSD FRML MDRD: 124 ML/MIN/1.73SQ M
GLUCOSE SERPL-MCNC: 70 MG/DL (ref 65–140)
HCT VFR BLD AUTO: 40.1 % (ref 34.8–46.1)
HGB BLD-MCNC: 12.4 G/DL (ref 11.5–15.4)
IMM GRANULOCYTES # BLD AUTO: 0.02 THOUSAND/UL (ref 0–0.2)
IMM GRANULOCYTES NFR BLD AUTO: 0 % (ref 0–2)
LIPASE SERPL-CCNC: 130 U/L (ref 73–393)
LYMPHOCYTES # BLD AUTO: 2.36 THOUSANDS/ΜL (ref 0.6–4.47)
LYMPHOCYTES NFR BLD AUTO: 32 % (ref 14–44)
MCH RBC QN AUTO: 28.7 PG (ref 26.8–34.3)
MCHC RBC AUTO-ENTMCNC: 30.9 G/DL (ref 31.4–37.4)
MCV RBC AUTO: 93 FL (ref 82–98)
MONOCYTES # BLD AUTO: 0.5 THOUSAND/ΜL (ref 0.17–1.22)
MONOCYTES NFR BLD AUTO: 7 % (ref 4–12)
NEUTROPHILS # BLD AUTO: 4.38 THOUSANDS/ΜL (ref 1.85–7.62)
NEUTS SEG NFR BLD AUTO: 58 % (ref 43–75)
NRBC BLD AUTO-RTO: 0 /100 WBCS
PLATELET # BLD AUTO: 187 THOUSANDS/UL (ref 149–390)
PMV BLD AUTO: 11.8 FL (ref 8.9–12.7)
POTASSIUM SERPL-SCNC: 4.6 MMOL/L (ref 3.5–5.3)
PROT SERPL-MCNC: 7 G/DL (ref 6.4–8.2)
RBC # BLD AUTO: 4.32 MILLION/UL (ref 3.81–5.12)
SODIUM SERPL-SCNC: 142 MMOL/L (ref 136–145)
WBC # BLD AUTO: 7.48 THOUSAND/UL (ref 4.31–10.16)

## 2019-08-02 PROCEDURE — 96361 HYDRATE IV INFUSION ADD-ON: CPT

## 2019-08-02 PROCEDURE — 96374 THER/PROPH/DIAG INJ IV PUSH: CPT

## 2019-08-02 PROCEDURE — 85025 COMPLETE CBC W/AUTO DIFF WBC: CPT | Performed by: EMERGENCY MEDICINE

## 2019-08-02 PROCEDURE — 96375 TX/PRO/DX INJ NEW DRUG ADDON: CPT

## 2019-08-02 PROCEDURE — 99284 EMERGENCY DEPT VISIT MOD MDM: CPT

## 2019-08-02 PROCEDURE — 36415 COLL VENOUS BLD VENIPUNCTURE: CPT | Performed by: EMERGENCY MEDICINE

## 2019-08-02 PROCEDURE — 99284 EMERGENCY DEPT VISIT MOD MDM: CPT | Performed by: EMERGENCY MEDICINE

## 2019-08-02 PROCEDURE — 83690 ASSAY OF LIPASE: CPT | Performed by: EMERGENCY MEDICINE

## 2019-08-02 PROCEDURE — 80053 COMPREHEN METABOLIC PANEL: CPT | Performed by: EMERGENCY MEDICINE

## 2019-08-02 PROCEDURE — 76705 ECHO EXAM OF ABDOMEN: CPT

## 2019-08-02 RX ORDER — HYDROMORPHONE HCL/PF 1 MG/ML
0.5 SYRINGE (ML) INJECTION ONCE
Status: COMPLETED | OUTPATIENT
Start: 2019-08-02 | End: 2019-08-02

## 2019-08-02 RX ORDER — ONDANSETRON 4 MG/1
4 TABLET, ORALLY DISINTEGRATING ORAL EVERY 6 HOURS PRN
Qty: 20 TABLET | Refills: 0 | Status: SHIPPED | OUTPATIENT
Start: 2019-08-02

## 2019-08-02 RX ORDER — ONDANSETRON 2 MG/ML
4 INJECTION INTRAMUSCULAR; INTRAVENOUS ONCE
Status: COMPLETED | OUTPATIENT
Start: 2019-08-02 | End: 2019-08-02

## 2019-08-02 RX ADMIN — SODIUM CHLORIDE 1000 ML: 0.9 INJECTION, SOLUTION INTRAVENOUS at 19:12

## 2019-08-02 RX ADMIN — ONDANSETRON 4 MG: 2 INJECTION INTRAMUSCULAR; INTRAVENOUS at 19:12

## 2019-08-02 RX ADMIN — HYDROMORPHONE HYDROCHLORIDE 0.5 MG: 1 INJECTION, SOLUTION INTRAMUSCULAR; INTRAVENOUS; SUBCUTANEOUS at 19:12

## 2019-08-03 NOTE — ED ATTENDING ATTESTATION
Gertrude Solis MD, saw and evaluated the patient  I have discussed the patient with the resident/non-physician practitioner and agree with the resident's/non-physician practitioner's findings, Plan of Care, and MDM as documented in the resident's/non-physician practitioner's note, except where noted  All available labs and Radiology studies were reviewed  I was present for key portions of any procedure(s) performed by the resident/non-physician practitioner and I was immediately available to provide assistance  At this point I agree with the current assessment done in the Emergency Department  I have conducted an independent evaluation of this patient a history and physical is as follows:  36 yo female S/P gastric sleeve c/o RUQ pain, N/V, no fever, with h/o gallstones, VS normal   Tender RUQ  Labs are normal   Gallbladder US c/w biliary colic  No other findings to suggest cholelithiasis    D/W Dr Maria Isabel Barriga Time  Procedures

## 2019-08-03 NOTE — ED PROVIDER NOTES
ASSESSMENT AND PLAN    Arsenio Khalil is a 35 y o  female with a history of gastric sleeve 2 months ago who presents for evaluation of right upper quadrant pain, nausea, vomiting  On arrival, the patient is hemodynamically stable and well-appearing without acute distress, with a nontoxic appearance, but uncomfortable appearing secondary to nausea, having 1 episode of vomiting during my evaluation  On exam , the patient displays tenderness to palpation in the right upper quadrant over the gallbladder  She has no signs of peritonitis  The physical exam is otherwise unremarkable   -Labwork largely unremarkable  -bedside ultrasound significant for significant cholelithiasis, and sludge  Formal ultrasound ordered, which did pre demonstrate these findings  -patient was given Dilaudid, Zofran for pain  On re-evaluation, she is pain-free, and no longer has nausea  She states she feels comfortable going home  -plan for discharge home  Strict return precautions provided  Given follow-up information to establish care General surgery for further management of symptomatic cholelithiasis  She may require cholecystectomy due to her symptoms    History  Chief Complaint   Patient presents with    Abdominal Pain     vomiting x2 days x1 days dizzines RUQ abd pain     This is a 19-year-old female with history of recent gastric sleeve, who presents for evaluation of abdominal pain  The patient states the pain is in the right upper quadrant, and has been associated with vomiting since onset  The vomiting started 2 days ago, and then the right upper quadrant pain started 1 day after  She states the right upper quadrant pain is sharp, intermittent, but does not have any relation with food  She has had pain like this before, and at that time, had an evaluation which revealed cholelithiasis  She did not follow up with the general surgeon after this    The patient states that since she began vomiting, she has been unable to keep any food down  She denies any fevers, chills, chest pain, shortness breath, lightheadedness, dizziness, diarrhea, or constipation  Her last bowel movement was today and was normal           Prior to Admission Medications   Prescriptions Last Dose Informant Patient Reported? Taking?   levothyroxine 50 mcg tablet   Yes No   Sig: Take 50 mcg by mouth daily      Facility-Administered Medications: None       Past Medical History:   Diagnosis Date    Hypothyroidism     with pregnancy        Past Surgical History:   Procedure Laterality Date    GASTRIC BYPASS      POLYPECTOMY         History reviewed  No pertinent family history  I have reviewed and agree with the history as documented  Social History     Tobacco Use    Smoking status: Never Smoker    Smokeless tobacco: Never Used   Substance Use Topics    Alcohol use: No    Drug use: No        Review of Systems   Constitutional: Negative for chills and fever  HENT: Negative for congestion and rhinorrhea  Eyes: Negative for photophobia and visual disturbance  Respiratory: Negative for cough and shortness of breath  Cardiovascular: Negative for chest pain and palpitations  Gastrointestinal: Positive for abdominal pain, nausea and vomiting  Negative for diarrhea  Genitourinary: Negative for dysuria and frequency  Musculoskeletal: Negative for neck pain and neck stiffness  Skin: Negative for pallor and rash  Neurological: Negative for light-headedness and headaches  All other systems reviewed and are negative        Physical Exam  ED Triage Vitals [08/02/19 1803]   Temperature Pulse Respirations Blood Pressure SpO2   97 9 °F (36 6 °C) 68 20 142/70 98 %      Temp Source Heart Rate Source Patient Position - Orthostatic VS BP Location FiO2 (%)   Oral Monitor Sitting Left arm --      Pain Score       8             Orthostatic Vital Signs  Vitals:    08/02/19 1803 08/02/19 2122   BP: 142/70 132/74   Pulse: 68 60   Patient Position - Orthostatic VS: Sitting        Physical Exam   Constitutional: She is oriented to person, place, and time  Awake and alert, well appearing, no acute distress  HENT:   Head: Normocephalic and atraumatic  Mouth/Throat: Oropharynx is clear and moist  No oropharyngeal exudate  Eyes: Pupils are equal, round, and reactive to light  EOM are normal  Right eye exhibits no discharge  Left eye exhibits no discharge  No scleral icterus  Neck: Normal range of motion  Neck supple  No JVD present  No tracheal deviation present  Cardiovascular: Normal rate, regular rhythm and normal heart sounds  No murmur heard  Pulmonary/Chest: Effort normal  No stridor  No respiratory distress  She has no wheezes  She has no rales  Abdominal:   There is tenderness to palpation of the right upper quadrant, without rebound, rigidity, guarding, or distention  Musculoskeletal: Normal range of motion  She exhibits no edema or deformity  Neurological: She is alert and oriented to person, place, and time  No cranial nerve deficit  She exhibits normal muscle tone  Skin: Skin is warm and dry  No rash noted  No pallor         ED Medications  Medications   ondansetron (ZOFRAN) injection 4 mg (4 mg Intravenous Given 8/2/19 1912)   HYDROmorphone (DILAUDID) injection 0 5 mg (0 5 mg Intravenous Given 8/2/19 1912)   sodium chloride 0 9 % bolus 1,000 mL (0 mL Intravenous Stopped 8/2/19 2122)       Diagnostic Studies  Results Reviewed     Procedure Component Value Units Date/Time    Comprehensive metabolic panel [[id number]]  (Abnormal) Collected:  08/02/19 1912    Lab Status:  Final result Specimen:  Blood from Arm, Right Updated:  08/02/19 1938     Sodium 142 mmol/L      Potassium 4 6 mmol/L      Chloride 108 mmol/L      CO2 22 mmol/L      ANION GAP 12 mmol/L      BUN 7 mg/dL      Creatinine 0 54 mg/dL      Glucose 70 mg/dL      Calcium 8 8 mg/dL      AST 38 U/L      ALT 17 U/L      Alkaline Phosphatase 49 U/L      Total Protein 7 0 g/dL      Albumin 3 1 g/dL      Total Bilirubin 0 64 mg/dL      eGFR 124 ml/min/1 73sq m     Narrative:       [place] guidelines for Chronic Kidney Disease (CKD):     Stage 1 with normal or high GFR (GFR > 90 mL/min/1 73 square meters)    Stage 2 Mild CKD (GFR = 60-89 mL/min/1 73 square meters)    Stage 3A Moderate CKD (GFR = 45-59 mL/min/1 73 square meters)    Stage 3B Moderate CKD (GFR = 30-44 mL/min/1 73 square meters)    Stage 4 Severe CKD (GFR = 15-29 mL/min/1 73 square meters)    Stage 5 End Stage CKD (GFR <15 mL/min/1 73 square meters)  Note: GFR calculation is accurate only with a steady state creatinine    Lipase [[id number]]  (Normal) Collected:  08/02/19 1912    Lab Status:  Final result Specimen:  Blood from Arm, Right Updated:  08/02/19 1938     Lipase 130 u/L     CBC and differential [[id number]]  (Abnormal) Collected:  08/02/19 1912    Lab Status:  Final result Specimen:  Blood from Arm, Right Updated:  08/02/19 1922     WBC 7 48 Thousand/uL      RBC 4 32 Million/uL      Hemoglobin 12 4 g/dL      Hematocrit 40 1 %      MCV 93 fL      MCH 28 7 pg      MCHC 30 9 g/dL      RDW 14 3 %      MPV 11 8 fL      Platelets 633 Thousands/uL      nRBC 0 /100 WBCs      Neutrophils Relative 58 %      Immat GRANS % 0 %      Lymphocytes Relative 32 %      Monocytes Relative 7 %      Eosinophils Relative 2 %      Basophils Relative 1 %      Neutrophils Absolute 4 38 Thousands/µL      Immature Grans Absolute 0 02 Thousand/uL      Lymphocytes Absolute 2 36 Thousands/µL      Monocytes Absolute 0 50 Thousand/µL      Eosinophils Absolute 0 18 Thousand/µL      Basophils Absolute 0 04 Thousands/µL                  US gallbladder   Final Result by Alicia King DO (08/02 1948)      Gallbladder is diffusely filled with stones and/or sludge  No wall thickening or ultrasonographic Retana's sign to suggest cholecystitis        Workstation performed: [id number]               Procedures  Procedures        ED Course MDM    Disposition  Final diagnoses:   Cholelithiasis   Abdominal pain   Nausea and vomiting     Time reflects when diagnosis was documented in both MDM as applicable and the Disposition within this note     Time User Action Codes Description Comment    8/2/2019  9:13 PM Kattyson Aileen Add [K80 20] Cholelithiasis     8/2/2019  9:15 PM Katherne Aileen Add [R10 9] Abdominal pain     8/2/2019  9:16 PM Katherne Aileen Add [R11 2] Nausea and vomiting       ED Disposition     ED Disposition Condition Date/Time Comment    Discharge Stable Fri Aug 2, 2019  9:13 PM [facility] [place]  Colon discharge to home/self care  Follow-up Information     Follow up With Specialties Details Why Contact Info    Corina Eisenmenger, MD General Surgery Call in 2 days to schedule an appointment for re-evaluation [address] [place], [place] [id number]            Discharge Medication List as of 8/2/2019  9:19 PM      START taking these medications    Details   ondansetron (ZOFRAN-ODT) 4 mg disintegrating tablet Take 1 tablet (4 mg total) by mouth every 6 (six) hours as needed for nausea or vomiting, Starting Fri 8/2/2019, Print         CONTINUE these medications which have NOT CHANGED    Details   levothyroxine 50 mcg tablet Take 50 mcg by mouth daily, Until Discontinued, Historical Med           No discharge procedures on file  ED Provider  Attending physically available and evaluated Les Age  I managed the patient along with the ED Attending      Electronically Signed by         Miguel Samuel MD  08/03/19 5482

## 2021-12-23 ENCOUNTER — HOSPITAL ENCOUNTER (EMERGENCY)
Facility: HOSPITAL | Age: 36
Discharge: HOME | End: 2021-12-23
Attending: EMERGENCY MEDICINE
Payer: COMMERCIAL

## 2021-12-23 VITALS
RESPIRATION RATE: 15 BRPM | DIASTOLIC BLOOD PRESSURE: 69 MMHG | TEMPERATURE: 102.3 F | HEART RATE: 105 BPM | HEIGHT: 63 IN | WEIGHT: 128.53 LBS | BODY MASS INDEX: 22.77 KG/M2 | SYSTOLIC BLOOD PRESSURE: 128 MMHG | OXYGEN SATURATION: 99 %

## 2021-12-23 DIAGNOSIS — Z20.822 CLOSE EXPOSURE TO COVID-19 VIRUS: ICD-10-CM

## 2021-12-23 DIAGNOSIS — R09.81 SINUS CONGESTION: ICD-10-CM

## 2021-12-23 DIAGNOSIS — K08.89 DENTALGIA: Primary | ICD-10-CM

## 2021-12-23 DIAGNOSIS — J06.9 URI (UPPER RESPIRATORY INFECTION): ICD-10-CM

## 2021-12-23 PROCEDURE — 99284 EMERGENCY DEPT VISIT MOD MDM: CPT | Performed by: EMERGENCY MEDICINE

## 2021-12-23 PROCEDURE — 99283 EMERGENCY DEPT VISIT LOW MDM: CPT

## 2021-12-23 PROCEDURE — 87636 SARSCOV2 & INF A&B AMP PRB: CPT | Performed by: EMERGENCY MEDICINE

## 2021-12-23 RX ORDER — FLUTICASONE PROPIONATE 50 MCG
1 SPRAY, SUSPENSION (ML) NASAL DAILY
Status: DISCONTINUED | OUTPATIENT
Start: 2021-12-23 | End: 2021-12-23 | Stop reason: HOSPADM

## 2021-12-23 RX ORDER — AMOXICILLIN AND CLAVULANATE POTASSIUM 875; 125 MG/1; MG/1
1 TABLET, FILM COATED ORAL ONCE
Status: COMPLETED | OUTPATIENT
Start: 2021-12-23 | End: 2021-12-23

## 2021-12-23 RX ORDER — AMOXICILLIN AND CLAVULANATE POTASSIUM 875; 125 MG/1; MG/1
1 TABLET, FILM COATED ORAL EVERY 12 HOURS
Qty: 14 TABLET | Refills: 0 | Status: SHIPPED | OUTPATIENT
Start: 2021-12-23 | End: 2021-12-30

## 2021-12-23 RX ORDER — LEVOTHYROXINE SODIUM 0.07 MG/1
TABLET ORAL EVERY 24 HOURS
COMMUNITY
Start: 2018-02-20 | End: 2021-12-23

## 2021-12-23 RX ORDER — OMEGA-3S/DHA/EPA/FISH OIL/D3 300MG-1000
1 CAPSULE ORAL DAILY
COMMUNITY

## 2021-12-23 RX ORDER — AMOXICILLIN AND CLAVULANATE POTASSIUM 875; 125 MG/1; MG/1
1 TABLET, FILM COATED ORAL EVERY 12 HOURS
Qty: 14 TABLET | Refills: 0 | Status: SHIPPED | OUTPATIENT
Start: 2021-12-23 | End: 2021-12-23 | Stop reason: SDUPTHER

## 2021-12-23 RX ORDER — OMEPRAZOLE MAGNESIUM 10 MG/1
20 GRANULE, DELAYED RELEASE ORAL DAILY
COMMUNITY

## 2021-12-23 RX ORDER — ACETAMINOPHEN 325 MG/1
975 TABLET ORAL ONCE
Status: COMPLETED | OUTPATIENT
Start: 2021-12-23 | End: 2021-12-23

## 2021-12-23 RX ORDER — DOXYCYCLINE HYCLATE 50 MG/1
324 CAPSULE, GELATIN COATED ORAL
COMMUNITY
Start: 2021-09-15

## 2021-12-23 RX ORDER — IBUPROFEN 600 MG/1
600 TABLET ORAL ONCE
Status: COMPLETED | OUTPATIENT
Start: 2021-12-23 | End: 2021-12-23

## 2021-12-23 RX ADMIN — ACETAMINOPHEN 975 MG: 325 TABLET, FILM COATED ORAL at 09:37

## 2021-12-23 RX ADMIN — AMOXICILLIN AND CLAVULANATE POTASSIUM 1 TABLET: 875; 125 TABLET, FILM COATED ORAL at 09:38

## 2021-12-23 RX ADMIN — FLUTICASONE PROPIONATE 1 SPRAY: 50 SPRAY, METERED NASAL at 09:40

## 2021-12-23 RX ADMIN — IBUPROFEN 600 MG: 600 TABLET, FILM COATED ORAL at 09:34

## 2021-12-24 LAB
FLUAV RNA RESP QL NAA+PROBE: POSITIVE
FLUBV RNA RESP QL NAA+PROBE: NEGATIVE
SARS-COV-2 RNA RESP QL NAA+PROBE: NEGATIVE

## 2022-03-18 ENCOUNTER — APPOINTMENT (EMERGENCY)
Dept: RADIOLOGY | Facility: HOSPITAL | Age: 37
End: 2022-03-18
Payer: COMMERCIAL

## 2022-03-18 ENCOUNTER — HOSPITAL ENCOUNTER (EMERGENCY)
Facility: HOSPITAL | Age: 37
Discharge: HOME | End: 2022-03-18
Attending: EMERGENCY MEDICINE | Admitting: EMERGENCY MEDICINE
Payer: COMMERCIAL

## 2022-03-18 VITALS
WEIGHT: 123 LBS | SYSTOLIC BLOOD PRESSURE: 115 MMHG | OXYGEN SATURATION: 100 % | RESPIRATION RATE: 18 BRPM | HEART RATE: 89 BPM | TEMPERATURE: 98.9 F | BODY MASS INDEX: 22.63 KG/M2 | DIASTOLIC BLOOD PRESSURE: 64 MMHG | HEIGHT: 62 IN

## 2022-03-18 DIAGNOSIS — Z90.710 HISTORY OF HYSTERECTOMY: ICD-10-CM

## 2022-03-18 DIAGNOSIS — N93.9 VAGINAL BLEEDING: Primary | ICD-10-CM

## 2022-03-18 LAB
ANION GAP SERPL CALCULATED.3IONS-SCNC: 9 MMOL/L (ref 4–13)
BASOPHILS # BLD AUTO: 0.02 THOUSANDS/ΜL (ref 0–0.1)
BASOPHILS NFR BLD AUTO: 0 % (ref 0–1)
BUN SERPL-MCNC: 10 MG/DL (ref 5–25)
CALCIUM SERPL-MCNC: 9 MG/DL (ref 8.3–10.1)
CHLORIDE SERPL-SCNC: 101 MMOL/L (ref 100–108)
CO2 SERPL-SCNC: 28 MMOL/L (ref 21–32)
CREAT SERPL-MCNC: 0.67 MG/DL (ref 0.6–1.3)
EOSINOPHIL # BLD AUTO: 0.04 THOUSAND/ΜL (ref 0–0.61)
EOSINOPHIL NFR BLD AUTO: 1 % (ref 0–6)
ERYTHROCYTE [DISTWIDTH] IN BLOOD BY AUTOMATED COUNT: 14.8 % (ref 11.6–15.1)
GFR SERPL CREATININE-BSD FRML MDRD: 113 ML/MIN/1.73SQ M
GLUCOSE SERPL-MCNC: 87 MG/DL (ref 65–140)
HCT VFR BLD AUTO: 29.1 % (ref 34.8–46.1)
HGB BLD-MCNC: 9.4 G/DL (ref 11.5–15.4)
IMM GRANULOCYTES # BLD AUTO: 0.04 THOUSAND/UL (ref 0–0.2)
IMM GRANULOCYTES NFR BLD AUTO: 1 % (ref 0–2)
LYMPHOCYTES # BLD AUTO: 0.79 THOUSANDS/ΜL (ref 0.6–4.47)
LYMPHOCYTES NFR BLD AUTO: 12 % (ref 14–44)
MCH RBC QN AUTO: 24.9 PG (ref 26.8–34.3)
MCHC RBC AUTO-ENTMCNC: 32.3 G/DL (ref 31.4–37.4)
MCV RBC AUTO: 77 FL (ref 82–98)
MONOCYTES # BLD AUTO: 0.61 THOUSAND/ΜL (ref 0.17–1.22)
MONOCYTES NFR BLD AUTO: 9 % (ref 4–12)
NEUTROPHILS # BLD AUTO: 5.16 THOUSANDS/ΜL (ref 1.85–7.62)
NEUTS SEG NFR BLD AUTO: 77 % (ref 43–75)
NRBC BLD AUTO-RTO: 0 /100 WBCS
PLATELET # BLD AUTO: 157 THOUSANDS/UL (ref 149–390)
PMV BLD AUTO: 10.2 FL (ref 8.9–12.7)
POTASSIUM SERPL-SCNC: 3.1 MMOL/L (ref 3.5–5.3)
RBC # BLD AUTO: 3.78 MILLION/UL (ref 3.81–5.12)
SODIUM SERPL-SCNC: 138 MMOL/L (ref 136–145)
WBC # BLD AUTO: 6.66 THOUSAND/UL (ref 4.31–10.16)

## 2022-03-18 PROCEDURE — 96361 HYDRATE IV INFUSION ADD-ON: CPT

## 2022-03-18 PROCEDURE — 99285 EMERGENCY DEPT VISIT HI MDM: CPT | Performed by: PHYSICIAN ASSISTANT

## 2022-03-18 PROCEDURE — 99284 EMERGENCY DEPT VISIT MOD MDM: CPT

## 2022-03-18 PROCEDURE — 73590 X-RAY EXAM OF LOWER LEG: CPT

## 2022-03-18 PROCEDURE — 85025 COMPLETE CBC W/AUTO DIFF WBC: CPT | Performed by: PHYSICIAN ASSISTANT

## 2022-03-18 PROCEDURE — 36415 COLL VENOUS BLD VENIPUNCTURE: CPT | Performed by: PHYSICIAN ASSISTANT

## 2022-03-18 PROCEDURE — 96360 HYDRATION IV INFUSION INIT: CPT

## 2022-03-18 PROCEDURE — NC001 PR NO CHARGE: Performed by: OBSTETRICS & GYNECOLOGY

## 2022-03-18 PROCEDURE — 80048 BASIC METABOLIC PNL TOTAL CA: CPT | Performed by: PHYSICIAN ASSISTANT

## 2022-03-18 RX ADMIN — SODIUM CHLORIDE 1000 ML: 0.9 INJECTION, SOLUTION INTRAVENOUS at 11:14
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
## 2022-03-18 NOTE — DISCHARGE INSTRUCTIONS
Robot Assisted Laparoscopic Hysterectomy   WHAT YOU SHOULD KNOW:   Robot-assisted laparoscopic hysterectomy (RH) is surgery to remove your uterus and cervix using a machine controlled by your surgeon  Your ovaries, fallopian tubes, supporting tissues, some lymph nodes, and the top of your vagina may also be removed  After RH, you will not be able to become pregnant  You will go through menopause if your ovaries are removed  AFTER YOU LEAVE:   Medicines:  · Medicines  may be given to decrease pain or prevent a bacterial infection  You may also need to take hormone medicine such as estrogen  Ask your healthcare provider how to take this medicine safely  · Take your medicine as directed  Call your healthcare provider if you think your medicine is not helping or if you have side effects  Tell him if you are allergic to any medicine  Keep a list of the medicines, vitamins, and herbs you take  Include the amounts, and when and why you take them  Bring the list or the pill bottles to follow-up visits  Carry your medicine list with you in case of an emergency  Activity guidelines:   · You may feel like resting more after surgery  Slowly start to do more each day  Rest when you feel it is needed  · Ask when you can start having sexual intercourse again  What to expect after surgery: It is normal to bleed from your vagina after your uterus and cervix are removed  Change the sanitary pad often to prevent infection  Ask your gynecologist or healthcare provider how much bleeding to expect  Contact your healthcare provider if:   · You have a fever  · Your pain is getting worse, even after you take medicine  · Your incisions look red and swollen, or they have bad-smelling drainage coming from them  · You see new or an increased amount of bright red blood coming from your vagina or your incisions  · You have yellow, green, or bad-smelling discharge coming from your vagina      · You feel pain when you urinate or you need to urinate more often than usual     · You have trouble having a bowel movement  · Your skin is itchy, swollen, or has a rash  · You have questions or concerns about your condition or care  Seek care immediately or call 911 if:   · You feel lightheaded, short of breath, and have chest pain  · You cough up blood  · Your arm or leg feels warm, tender, and painful  It may look swollen and red  · You have more bleeding from your vagina than you were told to expect  © 2014 [employer] [employer] is for End User's use only and may not be sold, redistributed or otherwise used for commercial purposes  All illustrations and images included in CareNotes® are the copyrighted property of [employer]  or Fredy Mcdonald  The above information is an  only  It is not intended as medical advice for individual conditions or treatments  Talk to your doctor, nurse or pharmacist before following any medical regimen to see if it is safe and effective for you
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
## 2022-03-19 ENCOUNTER — APPOINTMENT (EMERGENCY)
Dept: ULTRASOUND IMAGING | Facility: HOSPITAL | Age: 37
End: 2022-03-19
Payer: COMMERCIAL

## 2022-03-19 ENCOUNTER — HOSPITAL ENCOUNTER (EMERGENCY)
Facility: HOSPITAL | Age: 37
Discharge: HOME | End: 2022-03-19
Attending: EMERGENCY MEDICINE | Admitting: EMERGENCY MEDICINE
Payer: COMMERCIAL

## 2022-03-19 VITALS
OXYGEN SATURATION: 100 % | TEMPERATURE: 97.7 F | RESPIRATION RATE: 16 BRPM | SYSTOLIC BLOOD PRESSURE: 116 MMHG | HEART RATE: 88 BPM | DIASTOLIC BLOOD PRESSURE: 65 MMHG

## 2022-03-19 DIAGNOSIS — N93.9 EXCESSIVE VAGINAL BLEEDING: Primary | ICD-10-CM

## 2022-03-19 LAB
BASOPHILS # BLD AUTO: 0.02 THOUSANDS/ΜL (ref 0–0.1)
BASOPHILS NFR BLD AUTO: 0 % (ref 0–1)
EOSINOPHIL # BLD AUTO: 0.12 THOUSAND/ΜL (ref 0–0.61)
EOSINOPHIL NFR BLD AUTO: 2 % (ref 0–6)
ERYTHROCYTE [DISTWIDTH] IN BLOOD BY AUTOMATED COUNT: 14.8 % (ref 11.6–15.1)
HCT VFR BLD AUTO: 25.3 % (ref 34.8–46.1)
HGB BLD-MCNC: 7.9 G/DL (ref 11.5–15.4)
IMM GRANULOCYTES # BLD AUTO: 0.03 THOUSAND/UL (ref 0–0.2)
IMM GRANULOCYTES NFR BLD AUTO: 1 % (ref 0–2)
LYMPHOCYTES # BLD AUTO: 0.99 THOUSANDS/ΜL (ref 0.6–4.47)
LYMPHOCYTES NFR BLD AUTO: 19 % (ref 14–44)
MCH RBC QN AUTO: 24.3 PG (ref 26.8–34.3)
MCHC RBC AUTO-ENTMCNC: 31.2 G/DL (ref 31.4–37.4)
MCV RBC AUTO: 78 FL (ref 82–98)
MONOCYTES # BLD AUTO: 0.47 THOUSAND/ΜL (ref 0.17–1.22)
MONOCYTES NFR BLD AUTO: 9 % (ref 4–12)
NEUTROPHILS # BLD AUTO: 3.49 THOUSANDS/ΜL (ref 1.85–7.62)
NEUTS SEG NFR BLD AUTO: 69 % (ref 43–75)
NRBC BLD AUTO-RTO: 0 /100 WBCS
PLATELET # BLD AUTO: 144 THOUSANDS/UL (ref 149–390)
PMV BLD AUTO: 9.6 FL (ref 8.9–12.7)
RBC # BLD AUTO: 3.25 MILLION/UL (ref 3.81–5.12)
WBC # BLD AUTO: 5.12 THOUSAND/UL (ref 4.31–10.16)

## 2022-03-19 PROCEDURE — 99284 EMERGENCY DEPT VISIT MOD MDM: CPT | Performed by: EMERGENCY MEDICINE

## 2022-03-19 PROCEDURE — 76856 US EXAM PELVIC COMPLETE: CPT

## 2022-03-19 PROCEDURE — 99284 EMERGENCY DEPT VISIT MOD MDM: CPT

## 2022-03-19 PROCEDURE — 36415 COLL VENOUS BLD VENIPUNCTURE: CPT | Performed by: EMERGENCY MEDICINE

## 2022-03-19 PROCEDURE — 85025 COMPLETE CBC W/AUTO DIFF WBC: CPT | Performed by: EMERGENCY MEDICINE

## 2022-03-19 PROCEDURE — 76830 TRANSVAGINAL US NON-OB: CPT
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
## 2022-03-19 NOTE — CONSULTS
Consultation - Gynecology  [facility] [place]  Colon 39 y o  female MRN: [id number]  Unit/Bed#: ED 21 Encounter: [id number]      Consult to Obstetrics / Gynecology  Consult performed by: Claudio Kaur MD  Consult ordered by: Marley March, DO  Reason for consult: vaginal bleeding  Assessment/Recommendations: TVUS: no vaginal hematoma, intact cuff  Speculum exam performed by ED - unremarkable  Hgb 7 9, asymptomatic  Discharged home after discussion of return precautions  Recommend patient continue with iron pills  D/W Dr Anabella Montalvo who was present at the time of evaluation  Chief Complaint   Patient presents with    Vaginal Bleeding     Patient reports hysterectomy 1 week ago  seen in ED yesterday for bleeding and was told to return to department if bleeding returned  Patient reports large gush of blood this morning  History of Present Illness   Physician Requesting Consult: Marley March, DO  Reason for Consult / Principal Problem: vaginal bleeding s/p hysterectomy  Subspeciality: General GYN  HPI: Edmund Gilford is a 39 y o  female who presents with an episode of vaginal bleeding this morning which she describes as a "large gush"  She recently had a laparoscopy hysterectomy 1 week ago at [facility]  Patient has a known history of iron deficiency anemia prior to hysterectomy  She was seen yesterday for the same concern, and a clot was removed from the vagina  She denies lightheadedness, shortness of breath, and dizziness  Review of Systems   Respiratory: Negative for shortness of breath  Gastrointestinal: Negative for abdominal pain and vomiting  Genitourinary: Positive for vaginal bleeding  Negative for dysuria, hematuria, pelvic pain and vaginal pain  Neurological: Negative for dizziness, seizures, syncope, weakness and light-headedness  All other systems reviewed and are negative        Historical Information   Past Medical History:   Diagnosis Date    Endometriosis     Hypothyroidism     with pregnancy      Past Surgical History:   Procedure Laterality Date    CHOLECYSTECTOMY      GASTRIC BYPASS      HYSTERECTOMY      POLYPECTOMY       OB History    Para Term  AB Living   2 1 1   1 1   SAB IAB Ectopic Multiple Live Births   1     0 1      # Outcome Date GA Lbr Vargas/2nd Weight Sex Delivery Anes PTL Lv   2 Term 17 40w1d / 00:47 3345 g (7 lb 6 oz) F Vag-Spont EPI, Local N THOMAS   1 SAB 16 10w0d            History reviewed  No pertinent family history  Social History   Social History     Substance and Sexual Activity   Alcohol Use No     Social History     Substance and Sexual Activity   Drug Use No     Social History     Tobacco Use   Smoking Status Never Smoker   Smokeless Tobacco Never Used       Meds/Allergies   No current facility-administered medications for this encounter  No Known Allergies    Objective   Vitals: Blood pressure 116/65, pulse 88, temperature 97 7 °F (36 5 °C), temperature source Oral, resp  rate 16, last menstrual period 2019, SpO2 100 %, currently breastfeeding  There is no height or weight on file to calculate BMI  No intake or output data in the 24 hours ending 22 1509    Invasive Devices  Report    None                 Physical Exam  Constitutional:       Appearance: Normal appearance  Pulmonary:      Effort: Pulmonary effort is normal  No respiratory distress  Genitourinary:     Comments: Speculum and pelvic exam performed by ED staff - unremarkable  Neurological:      Mental Status: She is alert     Psychiatric:         Mood and Affect: Mood normal          Behavior: Behavior normal          Lab Results:   Recent Results (from the past 24 hour(s))   CBC and differential    Collection Time: 22  8:30 AM   Result Value Ref Range    WBC 5 12 4 31 - 10 16 Thousand/uL    RBC 3 25 (L) 3 81 - 5 12 Million/uL    Hemoglobin 7 9 (L) 11 5 - 15 4 g/dL    Hematocrit 25 3 (L) 34 8 - 46 1 %    MCV 78 (L) 82 - 98 fL    MCH 24 3 (L) 26 8 - 34 3 pg    MCHC 31 2 (L) 31 4 - 37 4 g/dL    RDW 14 8 11 6 - 15 1 %    MPV 9 6 8 9 - 12 7 fL    Platelets 468 (L) 478 - 390 Thousands/uL    nRBC 0 /100 WBCs    Neutrophils Relative 69 43 - 75 %    Immat GRANS % 1 0 - 2 %    Lymphocytes Relative 19 14 - 44 %    Monocytes Relative 9 4 - 12 %    Eosinophils Relative 2 0 - 6 %    Basophils Relative 0 0 - 1 %    Neutrophils Absolute 3 49 1 85 - 7 62 Thousands/µL    Immature Grans Absolute 0 03 0 00 - 0 20 Thousand/uL    Lymphocytes Absolute 0 99 0 60 - 4 47 Thousands/µL    Monocytes Absolute 0 47 0 17 - 1 22 Thousand/µL    Eosinophils Absolute 0 12 0 00 - 0 61 Thousand/µL    Basophils Absolute 0 02 0 00 - 0 10 Thousands/µL       Imaging:  Pelvic U/S  No valid procedures specified  Imaging Studies: I have personally reviewed pertinent reports  and I have personally reviewed pertinent films in PACS    INDICATION:  The patient is 39years old  Vaginal bleeding status post hysterectomy      COMPARISON: None     TECHNIQUE:   Transabdominal pelvic ultrasound was performed in sagittal and transverse planes with a curvilinear transducer  Additional transvaginal imaging was performed to better evaluate the endometrium and ovaries  Imaging included volumetric   sweeps as well as traditional still imaging technique      FINDINGS:     UTERUS:  The patient is status post hysterectomy  Unremarkable vaginal cuff      OVARIES/ADNEXA:  Right ovary:  3 1 x 3 3 x 1 6 cm  8 4 mL  No suspicious right ovarian abnormality  Doppler flow within normal limits      Left ovary:  3 1 x 3 0 x 2 1 cm  10 1 mL  No suspicious left ovarian abnormality  Dominant simple follicle in the ovary measures 1 8 cm, a physiologic finding  Doppler flow within normal limits      No suspicious adnexal mass or loculated collections    Trace complex fluid in the left adnexa      IMPRESSION:     Status post hysterectomy, unremarkable vaginal cuff      Dominant, physiologic left ovarian follicle with trace complex fluid in the adnexa  EKG, Pathology, and Other Studies: I have personally reviewed pertinent reports  Assessment/Plan     Assessment:  40 yo F with PMHx of iron deficiency anemia presenting with vaginal bleeding POD#7 from total laparoscopic hysterectomy  Patient was determined to not have a vaginal cuff hematoma and has an intact vaginal cuff  Plan:  1) Vaginal bleeding   TVUS: no vaginal hematoma, intact cuff   Speculum exam performed by ED - unremarkable   Hgb 7 9, asymptomatic   Discharged home after discussion of return precautions   Recommend patient continue with iron pills   D/W Dr Cj Martinez who was present at the time of evaluation  Code Status: Prior  Advance Directive and Living Will:      Power of :    POLST:      Counseling / Coordination of Care  Total floor / unit time spent today15 minutes  minutes  Greater than 50% of total time was spent with the patient and / or family counseling and / or coordination of care       Alyssa Che, [place]  3/19/2022  3:09 PM

## 2022-03-20 PROCEDURE — 99242 OFF/OP CONSLTJ NEW/EST SF 20: CPT | Performed by: OBSTETRICS & GYNECOLOGY

## 2022-12-13 NOTE — ED NOTES
Pt reports less bleeding than when arrived to ED  Scant blood on sanitary pad         Juanita Pulse, RN  03/18/22 8600 Tranexamic Acid Pregnancy And Lactation Text: It is unknown if this medication is safe during pregnancy or breast feeding.

## 2025-06-06 ENCOUNTER — HOSPITAL ENCOUNTER (EMERGENCY)
Facility: HOSPITAL | Age: 40
Discharge: HOME | End: 2025-06-06
Attending: EMERGENCY MEDICINE | Admitting: EMERGENCY MEDICINE
Payer: COMMERCIAL

## 2025-06-06 VITALS
RESPIRATION RATE: 16 BRPM | HEART RATE: 74 BPM | DIASTOLIC BLOOD PRESSURE: 72 MMHG | OXYGEN SATURATION: 98 % | TEMPERATURE: 98.2 F | SYSTOLIC BLOOD PRESSURE: 124 MMHG

## 2025-06-06 DIAGNOSIS — K08.89 PAIN, DENTAL: Primary | ICD-10-CM

## 2025-06-06 PROCEDURE — 99282 EMERGENCY DEPT VISIT SF MDM: CPT

## 2025-06-06 PROCEDURE — 99284 EMERGENCY DEPT VISIT MOD MDM: CPT | Performed by: PHYSICIAN ASSISTANT

## 2025-06-06 RX ORDER — AMOXICILLIN 500 MG/1
500 TABLET, FILM COATED ORAL 2 TIMES DAILY
Qty: 20 TABLET | Refills: 0 | Status: SHIPPED | OUTPATIENT
Start: 2025-06-06 | End: 2025-06-16

## 2025-06-06 NOTE — DISCHARGE INSTRUCTIONS
DISCHARGE INSTRUCTIONS:    FOLLOW UP WITH YOUR PRIMARY CARE PROVIDER OR THE [facility]. MAKE AN APPOINTMENT TO BE SEEN.     TAKE TYLENOL OR MOTRIN FOR PAIN.    TAKE ANTIBIOTICS AS PRESCRIBED. IF RASH, SHORTNESS OF BREATH OR TROUBLE SWALLOWING, STOP TAKING THE MEDICATION AND BE SEEN.     REST AND DRINK PLENTY OF FLUIDS.    FOLLOW UP WITH DENTIST.    IF SYMPTOMS WORSEN OR NEW SYMPTOMS ARISE, RETURN TO THE ER TO BE SEEN.

## 2025-06-06 NOTE — ED PROVIDER NOTES
Time reflects when diagnosis was documented in both MDM as applicable and the Disposition within this note       Time User Action Codes Description Comment    6/6/2025 10:47 AM Blanca Singer Add [K08.89] Pain, dental           ED Disposition       ED Disposition   Discharge    Condition   Stable    Date/Time   Fri Jun 6, 2025 10:47 AM    Comment   Milena Colon discharge to home/self care.                   Assessment & Plan       Medical Decision Making  39y.o female presents to the ER for left upper dental pain for 1 week. Vitals are stable. Patient is in no acute distress. On exam, no signs of ear infection. No signs of throat infection. Left upper gum at the wisdom tooth is red and swollen. No abscess or drainage seen. Area is tender to palpation. No induration or fluctuance palpated. No trismus. No trouble swallowing or handling secretions. No neck swelling. Breathing is non-labored. No tachypnea or accessory muscle use. Heart is regular rate. Abdomen is not distended. DDX consists of but not limited to: dental pain, dental infection, abscess, wisdom teeth. Will treat symptoms.    The management plan was discussed in detail with the patient at bedside and all questions were answered.  Prior to discharge, we provided both verbal and written instructions.  We discussed with the patient the signs and symptoms for which to return to the emergency department.  All questions were answered and patient was comfortable with the plan of care and discharged to home.  Instructed the patient to follow up with the primary care provider and/or specialist provided and their written instructions.  The patient verbalized understanding of our discussion and plan of care, and agrees to return to the Emergency Department for concerns and progression of illness.    At discharge, I instructed the patient to:  -follow up with pcp  -take Tylenol or Motrin for pain  -take Amoxicillin as prescribed  -rest and drink plenty of  fluids  -follow up with dentist  -return to the ER if symptoms worsened or new symptoms arose  Patient agreed to this plan and was stable at time of discharge.     Problems Addressed:  Pain, dental: acute illness or injury    Amount and/or Complexity of Data Reviewed  Independent Historian:      Details: Patient is historian    Risk  Prescription drug management.             Medications - No data to display    ED Risk Strat Scores                    No data recorded                            History of Present Illness       Chief Complaint   Patient presents with    Dental Pain     Pt reports left sided dental/ facial pain. States dentist was closed today       Past Medical History[1]   Past Surgical History[2]   Family History[3]   Social History[4]   E-Cigarette/Vaping    E-Cigarette Use Never User       E-Cigarette/Vaping Substances      I have reviewed and agree with the history as documented.     39y.o female with PMH of endometriosis and hypothyroidism presents to the ER for left upper dental pain for 1 week. Patient has been taking Advil for symptoms. She describes her pain as sharp and radiating down to her lower jaw. Pain is constant. She does have a dentist but they are closed today. She denies other complaints.      History provided by:  Patient   used: No        Review of Systems   Constitutional:  Negative for activity change, appetite change, chills and fever.   HENT:  Positive for dental problem. Negative for congestion, drooling, ear discharge, ear pain, facial swelling, rhinorrhea, sore throat and trouble swallowing.    Eyes:  Negative for redness.   Respiratory:  Negative for cough and shortness of breath.    Cardiovascular:  Negative for chest pain.   Gastrointestinal:  Negative for abdominal pain, diarrhea, nausea and vomiting.   Musculoskeletal:  Negative for neck stiffness.   Skin:  Negative for rash.   Allergic/Immunologic: Negative for food allergies.   Neurological:   Negative for weakness and numbness.           Objective       ED Triage Vitals   Temperature Pulse Blood Pressure Respirations SpO2 Patient Position - Orthostatic VS   06/06/25 1017 06/06/25 1016 06/06/25 1017 06/06/25 1016 06/06/25 1016 06/06/25 1017   98.2 °F (36.8 °C) 74 124/72 16 98 % Sitting      Temp Source Heart Rate Source BP Location FiO2 (%) Pain Score    06/06/25 1017 -- 06/06/25 1017 -- --    Oral  Right arm        Vitals      Date and Time Temp Pulse SpO2 Resp BP Pain Score FACES Pain Rating User   06/06/25 1017 98.2 °F (36.8 °C) -- -- -- 124/72 -- -- LP   06/06/25 1016 -- 74 98 % 16 -- -- -- LP            Physical Exam  Vitals and nursing note reviewed.   Constitutional:       General: She is not in acute distress.     Appearance: She is not toxic-appearing.   HENT:      Head: Normocephalic and atraumatic.      Right Ear: Tympanic membrane, ear canal and external ear normal. No drainage, swelling or tenderness. No foreign body. No hemotympanum. Tympanic membrane is not erythematous.      Left Ear: Tympanic membrane, ear canal and external ear normal. No drainage, swelling or tenderness. No foreign body. No hemotympanum. Tympanic membrane is not erythematous.      Nose: Nose normal.      Mouth/Throat:      Lips: Pink. No lesions.      Mouth: Mucous membranes are moist.      Dentition: Abnormal dentition. Dental tenderness and gingival swelling present.      Pharynx: Oropharynx is clear. Uvula midline. No pharyngeal swelling, posterior oropharyngeal erythema or uvula swelling.      Tonsils: No tonsillar exudate or tonsillar abscesses.       Eyes:      Conjunctiva/sclera: Conjunctivae normal.     Neck:      Trachea: Phonation normal. No tracheal deviation.     Cardiovascular:      Rate and Rhythm: Normal rate.   Pulmonary:      Effort: Pulmonary effort is normal. No respiratory distress.   Abdominal:      General: There is no distension.     Musculoskeletal:      Cervical back: Normal range of motion and  neck supple.     Skin:     General: Skin is warm and dry.      Findings: No rash.     Neurological:      Mental Status: She is alert.      GCS: GCS eye subscore is 4. GCS verbal subscore is 5. GCS motor subscore is 6.     Psychiatric:         Mood and Affect: Mood normal.         Results Reviewed       None            No orders to display       Procedures    ED Medication and Procedure Management   Prior to Admission Medications   Prescriptions Last Dose Informant Patient Reported? Taking?   Calcium Carbonate-Vitamin D (CALCIUM-VITAMIN D3 PO)   Yes No   Sig: Take 1 tablet by mouth 2 (two) times a day   Multiple Vitamin (MULTIVITAMIN ADULT PO)   Yes No   Sig: Take by mouth   Omeprazole Magnesium (PriLOSEC) 10 MG PACK   Yes No   Sig: Take 20 mg by mouth daily   cholecalciferol (VITAMIN D3) 400 units tablet   Yes No   Sig: Take 1 tablet by mouth daily   ferrous gluconate (FERGON) 324 mg tablet   Yes No   Sig: Take 324 mg by mouth   levothyroxine 50 mcg tablet   Yes No   Sig: Take 50 mcg by mouth daily   ondansetron (ZOFRAN-ODT) 4 mg disintegrating tablet   No No   Sig: Take 1 tablet (4 mg total) by mouth every 6 (six) hours as needed for nausea or vomiting      Facility-Administered Medications: None     Discharge Medication List as of 6/6/2025 10:48 AM        START taking these medications    Details   amoxicillin (AMOXIL) 500 MG tablet Take 1 tablet (500 mg total) by mouth 2 (two) times a day for 10 days, Starting Fri 6/6/2025, Until Mon 6/16/2025, Normal           CONTINUE these medications which have NOT CHANGED    Details   Calcium Carbonate-Vitamin D (CALCIUM-VITAMIN D3 PO) Take 1 tablet by mouth 2 (two) times a day, Historical Med      cholecalciferol (VITAMIN D3) 400 units tablet Take 1 tablet by mouth daily, Historical Med      ferrous gluconate (FERGON) 324 mg tablet Take 324 mg by mouth, Starting Wed 9/15/2021, Historical Med      levothyroxine 50 mcg tablet Take 50 mcg by mouth daily, Until Discontinued,  Historical Med      Multiple Vitamin (MULTIVITAMIN ADULT PO) Take by mouth, Historical Med      Omeprazole Magnesium (PriLOSEC) 10 MG PACK Take 20 mg by mouth daily, Historical Med      ondansetron (ZOFRAN-ODT) 4 mg disintegrating tablet Take 1 tablet (4 mg total) by mouth every 6 (six) hours as needed for nausea or vomiting, Starting Fri 8/2/2019, Print           No discharge procedures on file.  ED SEPSIS DOCUMENTATION   Time reflects when diagnosis was documented in both MDM as applicable and the Disposition within this note       Time User Action Codes Description Comment    6/6/2025 10:47 AM Blanca Singer Add [K08.89] Pain, dental                      [1]   Past Medical History:  Diagnosis Date    Endometriosis     Hypothyroidism     with pregnancy    [2]   Past Surgical History:  Procedure Laterality Date    CHOLECYSTECTOMY      GASTRIC BYPASS      HYSTERECTOMY      POLYPECTOMY     [3] No family history on file.  [4]   Social History  Tobacco Use    Smoking status: Never    Smokeless tobacco: Never   Vaping Use    Vaping status: Never Used   Substance Use Topics    Alcohol use: No    Drug use: No        Blanca Singer PA-C  06/06/25 1119